# Patient Record
Sex: MALE | Race: WHITE | NOT HISPANIC OR LATINO | Employment: OTHER | URBAN - METROPOLITAN AREA
[De-identification: names, ages, dates, MRNs, and addresses within clinical notes are randomized per-mention and may not be internally consistent; named-entity substitution may affect disease eponyms.]

---

## 2017-01-18 ENCOUNTER — HOSPITAL ENCOUNTER (EMERGENCY)
Facility: MEDICAL CENTER | Age: 37
End: 2017-01-18
Attending: EMERGENCY MEDICINE
Payer: MEDICAID

## 2017-01-18 VITALS
DIASTOLIC BLOOD PRESSURE: 72 MMHG | HEIGHT: 69 IN | HEART RATE: 76 BPM | OXYGEN SATURATION: 97 % | TEMPERATURE: 98.4 F | SYSTOLIC BLOOD PRESSURE: 117 MMHG | WEIGHT: 134.7 LBS | BODY MASS INDEX: 19.95 KG/M2 | RESPIRATION RATE: 16 BRPM

## 2017-01-18 DIAGNOSIS — M79.674 PAIN OF TOE OF RIGHT FOOT: ICD-10-CM

## 2017-01-18 LAB
ALBUMIN SERPL BCP-MCNC: 4.8 G/DL (ref 3.2–4.9)
ALBUMIN/GLOB SERPL: 1.7 G/DL
ALP SERPL-CCNC: 59 U/L (ref 30–99)
ALT SERPL-CCNC: 26 U/L (ref 2–50)
ANION GAP SERPL CALC-SCNC: 8 MMOL/L (ref 0–11.9)
APTT PPP: 26.9 SEC (ref 24.7–36)
AST SERPL-CCNC: 23 U/L (ref 12–45)
BASOPHILS # BLD AUTO: 1.2 % (ref 0–1.8)
BASOPHILS # BLD: 0.08 K/UL (ref 0–0.12)
BILIRUB SERPL-MCNC: 0.5 MG/DL (ref 0.1–1.5)
BUN SERPL-MCNC: 9 MG/DL (ref 8–22)
CALCIUM SERPL-MCNC: 9.3 MG/DL (ref 8.4–10.2)
CHLORIDE SERPL-SCNC: 103 MMOL/L (ref 96–112)
CO2 SERPL-SCNC: 27 MMOL/L (ref 20–33)
CREAT SERPL-MCNC: 0.85 MG/DL (ref 0.5–1.4)
EOSINOPHIL # BLD AUTO: 0.07 K/UL (ref 0–0.51)
EOSINOPHIL NFR BLD: 1 % (ref 0–6.9)
ERYTHROCYTE [DISTWIDTH] IN BLOOD BY AUTOMATED COUNT: 42 FL (ref 35.9–50)
GFR SERPL CREATININE-BSD FRML MDRD: >60 ML/MIN/1.73 M 2
GLOBULIN SER CALC-MCNC: 2.9 G/DL (ref 1.9–3.5)
GLUCOSE SERPL-MCNC: 98 MG/DL (ref 65–99)
HCT VFR BLD AUTO: 45.5 % (ref 42–52)
HGB BLD-MCNC: 15.9 G/DL (ref 14–18)
IMM GRANULOCYTES # BLD AUTO: 0.02 K/UL (ref 0–0.11)
IMM GRANULOCYTES NFR BLD AUTO: 0.3 % (ref 0–0.9)
INR PPP: 0.97 (ref 0.87–1.13)
LV EJECT FRACT  99904: 70
LV EJECT FRACT MOD 2C 99903: 72.37
LV EJECT FRACT MOD 4C 99902: 64.86
LV EJECT FRACT MOD BP 99901: 70.49
LYMPHOCYTES # BLD AUTO: 1.03 K/UL (ref 1–4.8)
LYMPHOCYTES NFR BLD: 15 % (ref 22–41)
MCH RBC QN AUTO: 31 PG (ref 27–33)
MCHC RBC AUTO-ENTMCNC: 34.9 G/DL (ref 33.7–35.3)
MCV RBC AUTO: 88.7 FL (ref 81.4–97.8)
MONOCYTES # BLD AUTO: 0.56 K/UL (ref 0–0.85)
MONOCYTES NFR BLD AUTO: 8.2 % (ref 0–13.4)
NEUTROPHILS # BLD AUTO: 5.1 K/UL (ref 1.82–7.42)
NEUTROPHILS NFR BLD: 74.3 % (ref 44–72)
NRBC # BLD AUTO: 0 K/UL
NRBC BLD AUTO-RTO: 0 /100 WBC
PLATELET # BLD AUTO: 255 K/UL (ref 164–446)
PMV BLD AUTO: 9.9 FL (ref 9–12.9)
POTASSIUM SERPL-SCNC: 3.8 MMOL/L (ref 3.6–5.5)
PROT SERPL-MCNC: 7.7 G/DL (ref 6–8.2)
PROTHROMBIN TIME: 12.7 SEC (ref 12–14.6)
RBC # BLD AUTO: 5.13 M/UL (ref 4.7–6.1)
SODIUM SERPL-SCNC: 138 MMOL/L (ref 135–145)
WBC # BLD AUTO: 6.9 K/UL (ref 4.8–10.8)

## 2017-01-18 PROCEDURE — 85025 COMPLETE CBC W/AUTO DIFF WBC: CPT

## 2017-01-18 PROCEDURE — 93922 UPR/L XTREMITY ART 2 LEVELS: CPT

## 2017-01-18 PROCEDURE — 93005 ELECTROCARDIOGRAM TRACING: CPT | Performed by: EMERGENCY MEDICINE

## 2017-01-18 PROCEDURE — 85610 PROTHROMBIN TIME: CPT

## 2017-01-18 PROCEDURE — 80053 COMPREHEN METABOLIC PANEL: CPT

## 2017-01-18 PROCEDURE — 36415 COLL VENOUS BLD VENIPUNCTURE: CPT

## 2017-01-18 PROCEDURE — 700102 HCHG RX REV CODE 250 W/ 637 OVERRIDE(OP): Performed by: EMERGENCY MEDICINE

## 2017-01-18 PROCEDURE — 93922 UPR/L XTREMITY ART 2 LEVELS: CPT | Mod: 26 | Performed by: SURGERY

## 2017-01-18 PROCEDURE — 93926 LOWER EXTREMITY STUDY: CPT

## 2017-01-18 PROCEDURE — A9270 NON-COVERED ITEM OR SERVICE: HCPCS | Performed by: EMERGENCY MEDICINE

## 2017-01-18 PROCEDURE — 99284 EMERGENCY DEPT VISIT MOD MDM: CPT

## 2017-01-18 PROCEDURE — 93306 TTE W/DOPPLER COMPLETE: CPT | Mod: 26 | Performed by: INTERNAL MEDICINE

## 2017-01-18 PROCEDURE — 85730 THROMBOPLASTIN TIME PARTIAL: CPT

## 2017-01-18 PROCEDURE — 93306 TTE W/DOPPLER COMPLETE: CPT

## 2017-01-18 PROCEDURE — 93926 LOWER EXTREMITY STUDY: CPT | Mod: 26 | Performed by: SURGERY

## 2017-01-18 RX ORDER — CLOPIDOGREL BISULFATE 75 MG/1
75 TABLET ORAL DAILY
Qty: 30 TAB | Refills: 1 | Status: SHIPPED | OUTPATIENT
Start: 2017-01-18 | End: 2018-02-01

## 2017-01-18 RX ORDER — IBUPROFEN 200 MG
400 TABLET ORAL
Status: SHIPPED | COMMUNITY
End: 2018-02-01

## 2017-01-18 RX ORDER — CLOPIDOGREL BISULFATE 75 MG/1
75 TABLET ORAL ONCE
Status: COMPLETED | OUTPATIENT
Start: 2017-01-18 | End: 2017-01-18

## 2017-01-18 RX ADMIN — CLOPIDOGREL 75 MG: 75 TABLET, FILM COATED ORAL at 16:24

## 2017-01-18 ASSESSMENT — PAIN SCALES - WONG BAKER: WONGBAKER_NUMERICALRESPONSE: HURTS EVEN MORE

## 2017-01-18 ASSESSMENT — PAIN SCALES - GENERAL: PAINLEVEL_OUTOF10: 6

## 2017-01-18 NOTE — ED AVS SNAPSHOT
Hyper Urban Level User [place] Access Code: [email or web address]  Expires: 1/28/2017  4:55 PM    Hyper Urban Level User [place]  A secure, online tool to manage your health information     [employer] Hyper Urban Level User [place]® is a secure, online tool that connects you to your personalized health information from the privacy of your home -- day or night - making it very easy for you to manage your healthcare. Once the activation process is completed, you can even access your medical information using the Hyper Urban Level User [place] randal, which is available for free in the Apple Randal store or Google Play store.     Hyper Urban Level User [place] provides the following levels of access (as shown below):   My Chart Features   [facility] Primary Care Doctor [facility]  Specialists [facility]  Urgent  Care Non-[facility]  Primary Care  Doctor   Email your healthcare team securely and privately 24/7 X X X X   Manage appointments: schedule your next appointment; view details of past/upcoming appointments X      Request prescription refills. X      View recent personal medical records, including lab and immunizations X X X X   View health record, including health history, allergies, medications X X X X   Read reports about your outpatient visits, procedures, consult and ER notes X X X X   See your discharge summary, which is a recap of your hospital and/or ER visit that includes your diagnosis, lab results, and care plan. X X       How to register for Hyper Urban Level User [place]:  1. Go to  [email or web address].  2. Click on the Sign Up Now box, which takes you to the New Member Sign Up page. You will need to provide the following information:  a. Enter your Hyper Urban Level User [place] Access Code exactly as it appears at the top of this page. (You will not need to use this code after you’ve completed the sign-up process. If you do not sign up before the expiration date, you must request a new code.)   b. Enter your date of birth.   c. Enter your home email address.   d. Click Submit, and follow the next screen’s instructions.  3. Create a Hyper Urban Level User [place] ID. This will be your Hyper Urban Level User [place]  login ID and cannot be changed, so think of one that is secure and easy to remember.  4. Create a [place] password. You can change your password at any time.  5. Enter your Password Reset Question and Answer. This can be used at a later time if you forget your password.   6. Enter your e-mail address. This allows you to receive e-mail notifications when new information is available in [employer]ian.  7. Click Sign Up. You can now view your health information.    For assistance activating your [employer] account, call [phone]

## 2017-01-18 NOTE — ED NOTES
POC discussed with pt using AIDET. Pt verbalized understanding. Pt with call light in reach and gurney in low position for pt safety. Pt given warm blanket for comfort.

## 2017-01-18 NOTE — ED AVS SNAPSHOT
After Visit Summary                                                                                                                Luis Manuel Hernandez   MRN: [id number]    Department:  [facility], Emergency Dept   Date of Visit:  1/18/2017            [facility], Emergency Dept    [address]    [place] [address]    Phone:  [phone]      You were seen by     1. Amadeo Aponte M.D.    2. Keaton Marte M.D.      Your Diagnosis Was     Pain of toe of right foot     M79.674       Follow-up Information     1. Follow up with Derik Estrada M.D..    Specialty:  Surgery    Contact information    [id number]  [place] [address] [phone]        Medication Information     Review all of your home medications and newly ordered medications with your primary doctor and/or pharmacist as soon as possible. Follow medication instructions as directed by your doctor and/or pharmacist.     Please keep your complete medication list with you and share with your physician. Update the information when medications are discontinued, doses are changed, or new medications (including over-the-counter products) are added; and carry medication information at all times in the event of emergency situations.               Medication List      START taking these medications        Instructions    clopidogrel 75 MG Tabs   Commonly known as:  PLAVIX    Take 1 Tab by mouth every day.   Dose:  75 mg         ASK your doctor about these medications        Instructions    ibuprofen 200 MG Tabs   Commonly known as:  MOTRIN    Take 400 mg by mouth every bedtime. Toe pain   Dose:  400 mg               Procedures and tests performed during your visit     APTT    ARTERIAL EVALUATION LOWER EXTREMITY    CBC WITH DIFFERENTIAL    COMP METABOLIC PANEL    EKG (NOW)    ESTIMATED GFR    IV SALINE LOCK    LE ART DUPLX/IMAG    LE ART/KEVYN    PROTHROMBIN TIME (INR)        Discharge Instructions        Pain Without a Known Cause  You have low blood flow in the affected toe. You're being started on Plavix to prevent clotting. Contact Dr. Estrada for appointment. If worsening symptoms go to [facility] ER for evaluation.  WHAT IS PAIN WITHOUT A KNOWN CAUSE?  Pain can occur in any part of the body and can range from mild to severe. Sometimes no cause can be found for why you are having pain. Some types of pain that can occur without a known cause include:   · Headache.  · Back pain.  · Abdominal pain.  · Neck pain.  HOW IS PAIN WITHOUT A KNOWN CAUSE DIAGNOSED?   Your health care provider will try to find the cause of your pain. This may include:  · Physical exam.  · Medical history.  · Blood tests.  · Urine tests.  · X-rays.  If no cause is found, your health care provider may diagnose you with pain without a known cause.   IS THERE TREATMENT FOR PAIN WITHOUT A CAUSE?   Treatment depends on the kind of pain you have. Your health care provider may prescribe medicines to help relieve your pain.   WHAT CAN I DO AT HOME FOR MY PAIN?   · Take medicines only as directed by your health care provider.  · Stop any activities that cause pain. During periods of severe pain, bed rest may help.  · Try to reduce your stress with activities such as yoga or meditation. Talk to your health care provider for other stress-reducing activity recommendations.  · Exercise regularly, if approved by your health care provider.  · Eat a healthy diet that includes fruits and vegetables. This may improve pain. Talk to your health care provider if you have any questions about your diet.  WHAT IF MY PAIN DOES NOT GET BETTER?   If you have a painful condition and no reason can be found for the pain or the pain gets worse, it is important to follow up with your health care provider. It may be necessary to repeat tests and look further for a possible cause.      This information is not intended to replace advice given to you by your health care  provider. Make sure you discuss any questions you have with your health care provider.     Document Released: 09/12/2002 Document Revised: 01/08/2016 Document Reviewed: 05/05/2015  Elsevier Interactive Patient Education ©2016 [employer]            Patient Information     Patient Information    Following emergency treatment: all patient requiring follow-up care must return either to a private physician or a clinic if your condition worsens before you are able to obtain further medical attention, please return to the emergency room.     Billing Information    At [facility], we work to make the billing process streamlined for our patients.  Our Representatives are here to answer any questions you may have regarding your hospital bill.  If you have insurance coverage and have supplied your insurance information to us, we will submit a claim to your insurer on your behalf.  Should you have any questions regarding your bill, we can be reached online or by phone as follows:  Online: You are able pay your bills online or live chat with our representatives about any billing questions you may have. We are here to help Monday - Friday from 8:00am to 7:30pm and 9:00am - 12:00pm on Saturdays.  Please visit [email or web address]  for more information.   Phone:  [phone] or [phone]    Please note that your emergency physician, surgeon, pathologist, radiologist, anesthesiologist, and other specialists are not employed by [facility] and will therefore bill separately for their services.  Please contact them directly for any questions concerning their bills at the numbers below:     Emergency Physician Services:  [phone]  [facility]:  [phone]  [facility]:  [phone]  [facility]:  [phone]    1. Your final bill may vary from the amount quoted upon discharge if all procedures are not complete at that time, or if your doctor  has additional procedures of which we are not aware. You will receive an additional bill if you return to the Emergency Department at [facility] for suture removal regardless of the facility of which the sutures were placed.     2. Please arrange for settlement of this account at the emergency registration.    3. All self-pay accounts are due in full at the time of treatment.  If you are unable to meet this obligation then payment is expected within 4-5 days.     4. If you have had radiology studies (CT, X-ray, Ultrasound, MRI), you have received a preliminary result during your emergency department visit. Please contact the radiology department [phone] to receive a copy of your final result. Please discuss the Final result with your primary physician or with the follow up physician provided.     Crisis Hotline:  [place] Crisis Hotline:  2-765-HVSKLRY or 1-668.449.5256  [place] Crisis Hotline:    1-207.874.5387 or 306-756-5519         ED Discharge Follow Up Questions    1. In order to provide you with very good care, we would like to follow up with a phone call in the next few days.  May we have your permission to contact you?     YES /  NO    2. What is the best phone number to call you? (       )_____-__________    3. What is the best time to call you?      Morning  /  Afternoon  /  Evening                   Patient Signature:  ____________________________________________________________    Date:  ____________________________________________________________

## 2017-01-18 NOTE — ED PROVIDER NOTES
ED Provider Note    CHIEF COMPLAINT  Chief Complaint   Patient presents with   • Toe Pain     R foot 4th toe       HPI  Luis Manuel Hernandez is a 36 y.o. male who presents with toe pain. It's the pain in the toe started 3 weeks ago. It was on 25 December. He came in was diagnosed with infection and placed on Bactrim and another antibiotic finished them with no improvement. He's had pain. The toes and then purple since the beginning. Primarily 3rd digit on the right foot. It's painful to touch. He's had no fever no chills he has no abdominal pain chest pain no trauma no shortness of breath no nausea or vomiting. Patient describes a stroke when he was in [place] and had surgery it sounds vascular. He had a surgery that resolved it. He has no other medical problems.    Social history: Smoker occasional alcohol no illicit drugs no IV drugs.        REVIEW OF SYSTEMS  See HPI for further details    PAST MEDICAL HISTORY  Past Medical History   Diagnosis Date   • Stroke (CMS-[facility])    • Memory loss    • Seizure (CMS-[facility])    • Brain aneurysm        FAMILY HISTORY  Family History   Problem Relation Age of Onset   • Heart Disease Maternal Grandmother    • Heart Disease Maternal Grandfather    • Cancer Maternal Grandfather 68     pancreatic       SOCIAL HISTORY  Social History     Social History   • Marital Status: Single     Spouse Name: N/A   • Number of Children: N/A   • Years of Education: N/A     Social History Main Topics   • Smoking status: Current Every Day Smoker -- 0.50 packs/day for 20 years     Types: Cigarettes   • Smokeless tobacco: Never Used   • Alcohol Use: 0.6 oz/week     1 Glasses of wine per week   • Drug Use: Yes     Special: Marijuana      Comment: speed, meth, and cocaine last used 15 years ago, never IV   • Sexual Activity:     Partners: Female     Birth Control/ Protection: Condom     Other Topics Concern   • Not on file     Social History Narrative       SURGICAL HISTORY  Past Surgical History   Procedure  "Laterality Date   • Craniotomy aneurysm Left      age 23   • Facial fracture orif         CURRENT MEDICATIONS  Home Medications     **Home medications have not yet been reviewed for this encounter**          ALLERGIES  No Known Allergies    PHYSICAL EXAM  VITAL SIGNS: /73 mmHg  Pulse 89  Temp(Src) 36.9 °C (98.4 °F)  Resp 16  Ht 1.753 m (5' 9\")  Wt 61.1 kg (134 lb 11.2 oz)  BMI 19.88 kg/m2    Constitutional: Patient is alert and oriented x3 in no distress   HENT: Moist mucous membranes  Eyes: No conjunctivitis or icterus  Neck: Trachea is midline no palpable thyroid  Lymphatic: Negative anterior cervical lymphadenopathy  Cardiovascular: Normal heart rate   Thorax & Lungs: Clear to auscultation  Abdomen: Nontender  Neurologic: Normal motor sensation  Extremities: Patient has deep purple of the 4th digit on the right foot distal tip. There is some firmness on the lateral aspect of the tip as well. He also has what appears to be some vascular insufficiency of the tips of the toes on the great toe on the right and the left toes as well that is minimal  Psychiatric: Affect normal, Judgment normal, Mood normal.     EKG: Normal sinus rhythm with a normal corrected QT interval rate is 63 normal QRS and normal axis nonspecific T-wave changes only no old EKG for comparison    Results for orders placed or performed during the hospital encounter of 01/18/17   CBC WITH DIFFERENTIAL   Result Value Ref Range    WBC 6.9 4.8 - 10.8 K/uL    RBC 5.13 4.70 - 6.10 M/uL    Hemoglobin 15.9 14.0 - 18.0 g/dL    Hematocrit 45.5 42.0 - 52.0 %    MCV 88.7 81.4 - 97.8 fL    MCH 31.0 27.0 - 33.0 pg    MCHC 34.9 33.7 - 35.3 g/dL    RDW 42.0 35.9 - 50.0 fL    Platelet Count 255 164 - 446 K/uL    MPV 9.9 9.0 - 12.9 fL    Neutrophils-Polys 74.30 (H) 44.00 - 72.00 %    Lymphocytes 15.00 (L) 22.00 - 41.00 %    Monocytes 8.20 0.00 - 13.40 %    Eosinophils 1.00 0.00 - 6.90 %    Basophils 1.20 0.00 - 1.80 %    Immature Granulocytes 0.30 0.00 - " 0.90 %    Nucleated RBC 0.00 /100 WBC    Neutrophils (Absolute) 5.10 1.82 - 7.42 K/uL    Lymphs (Absolute) 1.03 1.00 - 4.80 K/uL    Monos (Absolute) 0.56 0.00 - 0.85 K/uL    Eos (Absolute) 0.07 0.00 - 0.51 K/uL    Baso (Absolute) 0.08 0.00 - 0.12 K/uL    Immature Granulocytes (abs) 0.02 0.00 - 0.11 K/uL    NRBC (Absolute) 0.00 K/uL   COMP METABOLIC PANEL   Result Value Ref Range    Sodium 138 135 - 145 mmol/L    Potassium 3.8 3.6 - 5.5 mmol/L    Chloride 103 96 - 112 mmol/L    Co2 27 20 - 33 mmol/L    Anion Gap 8.0 0.0 - 11.9    Glucose 98 65 - 99 mg/dL    Bun 9 8 - 22 mg/dL    Creatinine 0.85 0.50 - 1.40 mg/dL    Calcium 9.3 8.4 - 10.2 mg/dL    AST(SGOT) 23 12 - 45 U/L    ALT(SGPT) 26 2 - 50 U/L    Alkaline Phosphatase 59 30 - 99 U/L    Total Bilirubin 0.5 0.1 - 1.5 mg/dL    Albumin 4.8 3.2 - 4.9 g/dL    Total Protein 7.7 6.0 - 8.2 g/dL    Globulin 2.9 1.9 - 3.5 g/dL    A-G Ratio 1.7 g/dL   PROTHROMBIN TIME (INR)   Result Value Ref Range    PT 12.7 12.0 - 14.6 sec    INR 0.97 0.87 - 1.13   APTT   Result Value Ref Range    APTT 26.9 24.7 - 36.0 sec   ESTIMATED GFR   Result Value Ref Range    GFR If African American >60 >60 mL/min/1.73 m 2    GFR If Non African American >60 >60 mL/min/1.73 m 2   EKG (NOW)   Result Value Ref Range    Report       [facility] Emergency Dept.    Test Date:  2017  Pt Name:    WENDY BEATTY                Department: EDS  MRN:        [id number]                      [facility]  Gender:     M                            Technician: VARGAS  :        [id number]                   Requested By:RENATO DAN  Order #:    [id number]                    Reading MD:    Measurements  Intervals                                Axis  Rate:       63                           P:          37  VA:         147                          QRS:        -5  QRSD:       88                           T:          42  QT:         380  QTc:        389    Interpretive  Statements  Sinus rhythm  Ventricular premature complex  No previous ECG available for comparison            COURSE & MEDICAL DECISION MAKING  Pertinent Labs & Imaging studies reviewed. (See chart for details)  Patient's toe pain appears to be vascular in nature. As if he had emboli or peripheral vascular disease. I am obtaining arterial studies as well as EKG to rule out atrial fibrillation.    2:05 PM: Labs returned normal. The patient's noninvasive study of the right leg is normal. PPG's of the toes were done and he does have significant decreased blood flow in the right affected toe.    Contacted Dr. Estrada. I am ordering an echo with bubble study to rule out a septal defect. If that is negative the patient be placed on Plavix and follow-up with vascular.        FINAL IMPRESSION  1.   1. Pain of toe of right foot        2.   3.         Electronically signed by: Amadeo Aponte, 1/18/2017 11:56 AM

## 2017-01-18 NOTE — ED NOTES
Pt with multiple toes on right foot discolored, purple. Pt seen here for same 2+ weeks ago. Pt was given antibiotics and pt states he finished meds as directed.

## 2017-01-18 NOTE — ED NOTES
Pt amb to triage c/o R 4th toe pain. Pt seen x3 wks ago for same s/s. Pt finished a course of antibiotics approx 1 wk ago. Pt states toe appears worse

## 2017-01-18 NOTE — DISCHARGE INSTRUCTIONS
Pain Without a Known Cause  You have low blood flow in the affected toe. You're being started on Plavix to prevent clotting. Contact Dr. Estrada for appointment. If worsening symptoms go to [facility] ER for evaluation.  WHAT IS PAIN WITHOUT A KNOWN CAUSE?  Pain can occur in any part of the body and can range from mild to severe. Sometimes no cause can be found for why you are having pain. Some types of pain that can occur without a known cause include:   · Headache.  · Back pain.  · Abdominal pain.  · Neck pain.  HOW IS PAIN WITHOUT A KNOWN CAUSE DIAGNOSED?   Your health care provider will try to find the cause of your pain. This may include:  · Physical exam.  · Medical history.  · Blood tests.  · Urine tests.  · X-rays.  If no cause is found, your health care provider may diagnose you with pain without a known cause.   IS THERE TREATMENT FOR PAIN WITHOUT A CAUSE?   Treatment depends on the kind of pain you have. Your health care provider may prescribe medicines to help relieve your pain.   WHAT CAN I DO AT HOME FOR MY PAIN?   · Take medicines only as directed by your health care provider.  · Stop any activities that cause pain. During periods of severe pain, bed rest may help.  · Try to reduce your stress with activities such as yoga or meditation. Talk to your health care provider for other stress-reducing activity recommendations.  · Exercise regularly, if approved by your health care provider.  · Eat a healthy diet that includes fruits and vegetables. This may improve pain. Talk to your health care provider if you have any questions about your diet.  WHAT IF MY PAIN DOES NOT GET BETTER?   If you have a painful condition and no reason can be found for the pain or the pain gets worse, it is important to follow up with your health care provider. It may be necessary to repeat tests and look further for a possible cause.      This information is not intended to replace advice given to you by your health care  provider. Make sure you discuss any questions you have with your health care provider.     Document Released: 09/12/2002 Document Revised: 01/08/2016 Document Reviewed: 05/05/2015  Elsevier Interactive Patient Education ©2016 [employer] Inc.

## 2017-01-18 NOTE — ED AVS SNAPSHOT
1/18/2017          Luis Manuel Hernandez  [address]  [place] [address]    Dear Luis Manuel:    [facility] wants to ensure your discharge home is safe and you or your loved ones have had all your questions answered regarding your care after you leave the hospital.    You may receive a telephone call within two days of your discharge.  This call is to make certain you understand your discharge instructions as well as ensure we provided you with the best care possible during your stay with us.     The call will only last approximately 3-5 minutes and will be done by a nurse.    Once again, we want to ensure your discharge home is safe and that you have a clear understanding of any next steps in your care.  If you have any questions or concerns, please do not hesitate to contact us, we are here for you.  Thank you for choosing [facility] for your healthcare needs.    Sincerely,    Leobardo Ferro    [facility]

## 2017-01-19 NOTE — ED NOTES
Pt D/C to home. IV removed. D/C instructions and prescriptions given. Pt v/u. Pt leaves ED with no acute changes, complaints or concerns.

## 2017-02-07 ENCOUNTER — HOSPITAL ENCOUNTER (OUTPATIENT)
Dept: RADIOLOGY | Facility: MEDICAL CENTER | Age: 37
End: 2017-02-07
Attending: SURGERY
Payer: MEDICAID

## 2017-02-07 DIAGNOSIS — I73.9 PERIPHERAL VASCULAR DISEASE, UNSPECIFIED (HCC): ICD-10-CM

## 2017-02-07 DIAGNOSIS — R23.0 BLUE TOES: ICD-10-CM

## 2017-02-07 PROCEDURE — 700117 HCHG RX CONTRAST REV CODE 255: Performed by: SURGERY

## 2017-02-07 PROCEDURE — 74175 CTA ABDOMEN W/CONTRAST: CPT

## 2017-02-07 RX ADMIN — IOHEXOL 100 ML: 350 INJECTION, SOLUTION INTRAVENOUS at 08:30

## 2017-02-27 LAB — EKG IMPRESSION: NORMAL

## 2017-12-20 ENCOUNTER — OFFICE VISIT (OUTPATIENT)
Dept: MEDICAL GROUP | Facility: MEDICAL CENTER | Age: 37
End: 2017-12-20
Attending: INTERNAL MEDICINE
Payer: MEDICAID

## 2017-12-20 VITALS
BODY MASS INDEX: 18.51 KG/M2 | HEIGHT: 69 IN | DIASTOLIC BLOOD PRESSURE: 70 MMHG | HEART RATE: 92 BPM | WEIGHT: 125 LBS | RESPIRATION RATE: 16 BRPM | OXYGEN SATURATION: 98 % | SYSTOLIC BLOOD PRESSURE: 100 MMHG | TEMPERATURE: 97.8 F

## 2017-12-20 DIAGNOSIS — R00.2 PALPITATIONS: ICD-10-CM

## 2017-12-20 PROCEDURE — 93000 ELECTROCARDIOGRAM COMPLETE: CPT | Performed by: INTERNAL MEDICINE

## 2017-12-20 PROCEDURE — 99213 OFFICE O/P EST LOW 20 MIN: CPT | Performed by: INTERNAL MEDICINE

## 2017-12-20 PROCEDURE — 99214 OFFICE O/P EST MOD 30 MIN: CPT | Performed by: INTERNAL MEDICINE

## 2017-12-20 ASSESSMENT — PAIN SCALES - GENERAL: PAINLEVEL: 3=SLIGHT PAIN

## 2017-12-20 NOTE — PROGRESS NOTES
"Subjective:   Luis Manuel Hernandez is a 37 y.o. male here today for Racing heartbeat, chest discomfort for several months    Palpitations  Patient reports that for the past several months, he has been experiencing a racing heartbeat. Initially, it was not daily, but it has become more frequent and now happen several times per day. States he feels like his heart is about to jump out of his chest when it's happening. He denies any associated shortness of breath. He complains of some chest discomfort but it may or may not coincide with the palpitations. States it feels like someone is \"ripping my skin off my chest\" when he rolls over in bed or moves in a certain position. About 3 weeks ago, he was helping his mom move a couch when he started to feel his heart racing, became anxious and short of breath.  Mom says he lost all the color out of his face and was clutching his chest but he denies any memory of pain. He has never sought emergency room care for any of these issues. Most of the time, his symptoms last about 5-10 minutes before he can calm himself down. He does have a family history of heart disease with his grandmother requiring open heart surgery in her early 40s. He has some anxiety associated with the palpitations but it's difficult for him to say whether the anxiety precedes the palpitations or the palpitations provoke the anxiety. However, he feels like the anxiety subsides but the palpitations still persist for several more minutes.       Current medicines (including changes today)  Current Outpatient Prescriptions   Medication Sig Dispense Refill   • ibuprofen (MOTRIN) 200 MG Tab Take 400 mg by mouth every bedtime. Toe pain     • clopidogrel (PLAVIX) 75 MG Tab Take 1 Tab by mouth every day. 30 Tab 1     No current facility-administered medications for this visit.      He  has a past medical history of Brain aneurysm; Memory loss; Seizure (CMS-HCC); and Stroke (CMS-[facility]).    ROS   As above in HPI     Objective: " "    Blood pressure 100/70, pulse 92, temperature 36.6 °C (97.8 °F), resp. rate 16, height 1.753 m (5' 9.02\"), weight 56.7 kg (125 lb), SpO2 98 %. Body mass index is 18.45 kg/m².   Physical Exam:  Constitutional: Alert, no distress.  Skin: Warm, dry, good turgor, no rashes in visible areas.  Eye: Equal, round and reactive, conjunctiva clear, lids normal.  Respiratory: Unlabored respiratory effort, lungs clear to auscultation, no wheezes, no ronchi.  Cardiovascular: regular rate and rhythm, no murmurs appreciated.  Psych: Alert and oriented x3, normal affect and mood.    EKG Interpretation    Interpreted by me    Rhythm: normal sinus   Rate: bradycardia and 50-60  Axis: normal  Ectopy: none  Conduction: normal  ST Segments: no acute change  T Waves: no acute change  Q Waves: none    Clinical Impression: no acute changes      Assessment and Plan:   The following treatment plan was discussed    1. Palpitations  No acute changes on EKG in clinic today although he did have PVCs on a previous EKG. Since he is symptomatic multiple times daily, we will obtain a 24-hour Holter monitor. Since he had some chest discomfort associated with some of the palpitations and has a family history of coronary artery disease, we will also get a stress test. Patient will follow-up with me for results after completing these tests.  - HOLTER - Cardiology Performed (24HR); Future  - CARDIAC STRESS TEST TREADMILL ONLY  - EKG      Followup: Return in about 4 weeks (around 1/17/2018) for test results.         "

## 2017-12-20 NOTE — ASSESSMENT & PLAN NOTE
"Patient reports that for the past several months, he has been experiencing a racing heartbeat. Initially, it was not daily, but it has become more frequent and now happen several times per day. States he feels like his heart is about to jump out of his chest when it's happening. He denies any associated shortness of breath. He complains of some chest discomfort but it may or may not coincide with the palpitations. States it feels like someone is \"ripping my skin off my chest\" when he rolls over in bed or moves in a certain position. About 3 weeks ago, he was helping his mom move a couch when he started to feel his heart racing, became anxious and short of breath.  Mom says he lost all the color out of his face and was clutching his chest but he denies any memory of pain. He has never sought emergency room care for any of these issues. Most of the time, his symptoms last about 5-10 minutes before he can calm himself down. He does have a family history of heart disease with his grandmother requiring open heart surgery in her early 40s. He has some anxiety associated with the palpitations but it's difficult for him to say whether the anxiety precedes the palpitations or the palpitations provoke the anxiety. However, he feels like the anxiety subsides but the palpitations still persist for several more minutes.  "

## 2018-01-10 ENCOUNTER — NON-PROVIDER VISIT (OUTPATIENT)
Dept: CARDIOLOGY | Facility: MEDICAL CENTER | Age: 38
End: 2018-01-10
Attending: INTERNAL MEDICINE
Payer: MEDICAID

## 2018-01-10 DIAGNOSIS — R00.2 PALPITATIONS: ICD-10-CM

## 2018-01-10 LAB — TREADMILL STRESS: NORMAL

## 2018-01-10 PROCEDURE — 93015 CV STRESS TEST SUPVJ I&R: CPT | Performed by: INTERNAL MEDICINE

## 2018-01-23 ENCOUNTER — NON-PROVIDER VISIT (OUTPATIENT)
Dept: CARDIOLOGY | Facility: MEDICAL CENTER | Age: 38
End: 2018-01-23
Payer: MEDICAID

## 2018-01-23 ENCOUNTER — TELEPHONE (OUTPATIENT)
Dept: MEDICAL GROUP | Facility: MEDICAL CENTER | Age: 38
End: 2018-01-23

## 2018-01-23 DIAGNOSIS — R00.1 SINUS BRADYCARDIA: ICD-10-CM

## 2018-01-23 DIAGNOSIS — R00.2 PALPITATIONS: ICD-10-CM

## 2018-01-23 DIAGNOSIS — I49.3 PVC (PREMATURE VENTRICULAR CONTRACTION): ICD-10-CM

## 2018-01-23 NOTE — TELEPHONE ENCOUNTER
Patient stopped by the office requesting stress test results. Patient would like to get a call back with that information.

## 2018-01-24 NOTE — TELEPHONE ENCOUNTER
You can let him know the stress test was normal.  I am seeing him on 2/1 so we can chat further if he has question.  Audelia Capps M.D.

## 2018-02-01 ENCOUNTER — OFFICE VISIT (OUTPATIENT)
Dept: MEDICAL GROUP | Facility: MEDICAL CENTER | Age: 38
End: 2018-02-01
Attending: INTERNAL MEDICINE
Payer: MEDICAID

## 2018-02-01 VITALS
HEIGHT: 69 IN | WEIGHT: 124 LBS | OXYGEN SATURATION: 98 % | HEART RATE: 80 BPM | RESPIRATION RATE: 16 BRPM | DIASTOLIC BLOOD PRESSURE: 60 MMHG | BODY MASS INDEX: 18.37 KG/M2 | SYSTOLIC BLOOD PRESSURE: 92 MMHG | TEMPERATURE: 98 F

## 2018-02-01 DIAGNOSIS — Z13.1 SCREENING FOR DIABETES MELLITUS: ICD-10-CM

## 2018-02-01 DIAGNOSIS — R53.83 OTHER FATIGUE: ICD-10-CM

## 2018-02-01 DIAGNOSIS — R07.89 MUSCULOSKELETAL CHEST PAIN: ICD-10-CM

## 2018-02-01 DIAGNOSIS — R00.2 PALPITATIONS: ICD-10-CM

## 2018-02-01 PROCEDURE — 99212 OFFICE O/P EST SF 10 MIN: CPT | Performed by: INTERNAL MEDICINE

## 2018-02-01 PROCEDURE — 99214 OFFICE O/P EST MOD 30 MIN: CPT | Performed by: INTERNAL MEDICINE

## 2018-02-01 ASSESSMENT — PAIN SCALES - GENERAL: PAINLEVEL: NO PAIN

## 2018-02-02 PROBLEM — R07.89 MUSCULOSKELETAL CHEST PAIN: Status: ACTIVE | Noted: 2018-02-02

## 2018-02-02 LAB — EKG IMPRESSION: NORMAL

## 2018-02-02 PROCEDURE — 93224 XTRNL ECG REC UP TO 48 HRS: CPT | Performed by: INTERNAL MEDICINE

## 2018-02-02 NOTE — PROGRESS NOTES
"Subjective:   Luis Manuel Hernandez is a 37 y.o. male here today for review of stress test, follow-up chest pain and palpitations, fatigue    Fatigue  Patient reports worsening fatigue throughout the day. He does not fall asleep easily while watching TV or reading a book until around 8 PM. States he is sleeping well through the night although sometimes he does wake up around 4 AM and he does notice increased fatigue on these days. He denies snoring, awakening unable to breathe. He does not have any recent blood work in the system. He recently had a negative treadmill stress test.     Palpitations  Patient states that he just turned in his Holter monitor yesterday. I have not received the results of this yet. He states that when he was wearing it for the 24 hours she did not have the same degree of palpitations that he generally feels. He does report that some of the palpitations and pain in his chest that he was feeling do seem to be related to anxiety.     Musculoskeletal chest pain  Patient reports he continues to experience a sort of tearing chest pain when he twists in a certain direction. He had a treadmill cardiac stress test done recently which was completely normal and he did reach a high enough heart rate for her to be considered accurate. He initially started having the pain when he was carrying a heavy couch. It has subsided somewhat.       Current medicines (including changes today)  No current outpatient prescriptions on file.     No current facility-administered medications for this visit.      He  has a past medical history of Brain aneurysm; Memory loss; Seizure (CMS-[facility]); and Stroke (CMS-HCC).    ROS   As above in HPI  Denies bleeding, melena, hematochezia  Complains of shortness of breath at rest but not with exertion, around times of anxiety     Objective:     Blood pressure (!) 92/60, pulse 80, temperature 36.7 °C (98 °F), resp. rate 16, height 1.753 m (5' 9.02\"), weight 56.2 kg (124 lb), SpO2 98 %. " Body mass index is 18.3 kg/m².   Physical Exam:  Constitutional: Alert, no distress.  Skin: Warm, dry, good turgor, no rashes in visible areas.  Eye: Equal, round and reactive, conjunctiva clear, lids normal.  Respiratory: Unlabored respiratory effort, lungs clear to auscultation, no wheezes, no ronchi.  Cardiovascular: Normal S1, S2, no murmur, no edema.  Psych: Alert and oriented x3, normal affect and mood.    Results and Imaging:   Reviewed results of patient's Holter monitor which showed 145 PVCs in the 24-hour time period. He also had a few PACs. His rate was anywhere from 47 during sleep up to 120 and always sinus.    Reviewed results of treadmill cardiac stress test which was normal    Assessment and Plan:   The following treatment plan was discussed    1. Other fatigue  No signs or symptoms to suggest sleep apnea. On the nights when he is waking up at 4 AM he does experience greater fatigue and some of his daily fatigue may be related to lack of sleep. We will rule out anemia, diabetes, and hypothyroidism with blood work.  - TSH WITH REFLEX TO FT4; Future  - CBC WITHOUT DIFFERENTIAL; Future  - HEMOGLOBIN A1C; Future    2. Palpitations  Appears to be PVCs that patient is feeling based on his 24-hour Holter monitor.  Results received and this no completed after patient's appointment. Therefore we will call him and inform him. If symptoms are extremely bothersome, could start him on a low-dose beta blocker otherwise no treatment indicated based on Holter    3. Musculoskeletal chest pain  With a negative treadmill stress test, very unlikely to be anginal pain. Given its relation to his position I suspect a musculoskeletal strain, possibly when he was moving heavy furniture. Reassurance given today.    4. Screening for diabetes mellitus  - HEMOGLOBIN A1C; Future        Followup: Return in about 3 months (around 5/1/2018), or if symptoms worsen or fail to improve.

## 2018-02-02 NOTE — ASSESSMENT & PLAN NOTE
Patient reports he continues to experience a sort of tearing chest pain when he twists in a certain direction. He had a treadmill cardiac stress test done recently which was completely normal and he did reach a high enough heart rate for her to be considered accurate. He initially started having the pain when he was carrying a heavy couch. It has subsided somewhat.

## 2018-02-02 NOTE — ASSESSMENT & PLAN NOTE
Patient reports worsening fatigue throughout the day. He does not fall asleep easily while watching TV or reading a book until around 8 PM. States he is sleeping well through the night although sometimes he does wake up around 4 AM and he does notice increased fatigue on these days. He denies snoring, awakening unable to breathe. He does not have any recent blood work in the system. He recently had a negative treadmill stress test.

## 2018-02-02 NOTE — ASSESSMENT & PLAN NOTE
Patient states that he just turned in his Holter monitor yesterday. I have not received the results of this yet. He states that when he was wearing it for the 24 hours she did not have the same degree of palpitations that he generally feels. He does report that some of the palpitations and pain in his chest that he was feeling do seem to be related to anxiety.

## 2018-02-06 ENCOUNTER — TELEPHONE (OUTPATIENT)
Dept: MEDICAL GROUP | Facility: MEDICAL CENTER | Age: 38
End: 2018-02-06

## 2018-02-06 DIAGNOSIS — I49.3 PVCS (PREMATURE VENTRICULAR CONTRACTIONS): ICD-10-CM

## 2018-02-06 DIAGNOSIS — R00.2 PALPITATIONS: ICD-10-CM

## 2018-02-06 NOTE — TELEPHONE ENCOUNTER
----- Message from Audelia Capps M.D. sent at 2/6/2018  7:45 AM PST -----  Please inform patient cardiology referral has been placed and he should receive a call to set up his appointment.    Audelia Capps M.D.

## 2018-02-20 ENCOUNTER — HOSPITAL ENCOUNTER (OUTPATIENT)
Dept: LAB | Facility: MEDICAL CENTER | Age: 38
End: 2018-02-20
Attending: INTERNAL MEDICINE
Payer: MEDICAID

## 2018-02-20 DIAGNOSIS — R53.83 OTHER FATIGUE: ICD-10-CM

## 2018-02-20 DIAGNOSIS — Z13.1 SCREENING FOR DIABETES MELLITUS: ICD-10-CM

## 2018-02-20 LAB
ERYTHROCYTE [DISTWIDTH] IN BLOOD BY AUTOMATED COUNT: 45.1 FL (ref 35.9–50)
EST. AVERAGE GLUCOSE BLD GHB EST-MCNC: 105 MG/DL
HBA1C MFR BLD: 5.3 % (ref 0–5.6)
HCT VFR BLD AUTO: 45.9 % (ref 42–52)
HGB BLD-MCNC: 15.4 G/DL (ref 14–18)
MCH RBC QN AUTO: 31 PG (ref 27–33)
MCHC RBC AUTO-ENTMCNC: 33.6 G/DL (ref 33.7–35.3)
MCV RBC AUTO: 92.4 FL (ref 81.4–97.8)
PLATELET # BLD AUTO: 284 K/UL (ref 164–446)
PMV BLD AUTO: 9.9 FL (ref 9–12.9)
RBC # BLD AUTO: 4.97 M/UL (ref 4.7–6.1)
TSH SERPL DL<=0.005 MIU/L-ACNC: 1.96 UIU/ML (ref 0.38–5.33)
WBC # BLD AUTO: 7.9 K/UL (ref 4.8–10.8)

## 2018-02-20 PROCEDURE — 85027 COMPLETE CBC AUTOMATED: CPT

## 2018-02-20 PROCEDURE — 36415 COLL VENOUS BLD VENIPUNCTURE: CPT

## 2018-02-20 PROCEDURE — 84443 ASSAY THYROID STIM HORMONE: CPT

## 2018-02-20 PROCEDURE — 83036 HEMOGLOBIN GLYCOSYLATED A1C: CPT

## 2018-02-22 ENCOUNTER — TELEPHONE (OUTPATIENT)
Dept: MEDICAL GROUP | Facility: MEDICAL CENTER | Age: 38
End: 2018-02-22

## 2018-02-22 NOTE — TELEPHONE ENCOUNTER
----- Message from Audelia Capps M.D. sent at 2/21/2018 12:43 PM PST -----  Please mail normal labs letter.  Audelia Capps M.D.

## 2019-10-22 ENCOUNTER — HOSPITAL ENCOUNTER (EMERGENCY)
Facility: MEDICAL CENTER | Age: 39
End: 2019-10-23
Attending: EMERGENCY MEDICINE
Payer: MEDICAID

## 2019-10-22 ENCOUNTER — APPOINTMENT (OUTPATIENT)
Dept: RADIOLOGY | Facility: MEDICAL CENTER | Age: 39
End: 2019-10-22
Attending: EMERGENCY MEDICINE
Payer: MEDICAID

## 2019-10-22 DIAGNOSIS — F10.920 ALCOHOLIC INTOXICATION WITHOUT COMPLICATION (HCC): ICD-10-CM

## 2019-10-22 DIAGNOSIS — F32.A DEPRESSION, UNSPECIFIED DEPRESSION TYPE: ICD-10-CM

## 2019-10-22 LAB — POC BREATHALIZER: 0.13 PERCENT (ref 0–0.01)

## 2019-10-22 PROCEDURE — 302970 POC BREATHALIZER: Performed by: EMERGENCY MEDICINE

## 2019-10-22 PROCEDURE — 71045 X-RAY EXAM CHEST 1 VIEW: CPT

## 2019-10-22 PROCEDURE — 93005 ELECTROCARDIOGRAM TRACING: CPT | Performed by: EMERGENCY MEDICINE

## 2019-10-22 PROCEDURE — 99285 EMERGENCY DEPT VISIT HI MDM: CPT

## 2019-10-22 ASSESSMENT — LIFESTYLE VARIABLES
TOTAL SCORE: 0
EVER HAD A DRINK FIRST THING IN THE MORNING TO STEADY YOUR NERVES TO GET RID OF A HANGOVER: NO
EVER FELT BAD OR GUILTY ABOUT YOUR DRINKING: NO
DO YOU DRINK ALCOHOL: YES
HAVE YOU EVER FELT YOU SHOULD CUT DOWN ON YOUR DRINKING: NO
HAVE PEOPLE ANNOYED YOU BY CRITICIZING YOUR DRINKING: NO
CONSUMPTION TOTAL: INCOMPLETE

## 2019-10-23 VITALS
WEIGHT: 130 LBS | HEIGHT: 68 IN | HEART RATE: 90 BPM | SYSTOLIC BLOOD PRESSURE: 136 MMHG | DIASTOLIC BLOOD PRESSURE: 102 MMHG | TEMPERATURE: 98 F | RESPIRATION RATE: 16 BRPM | BODY MASS INDEX: 19.7 KG/M2

## 2019-10-23 LAB
EKG IMPRESSION: NORMAL
POC BREATHALIZER: 0.05 PERCENT (ref 0–0.01)

## 2019-10-23 PROCEDURE — 302970 POC BREATHALIZER: Performed by: EMERGENCY MEDICINE

## 2019-10-23 NOTE — ED TRIAGE NOTES
"Patient BIB EMS for alcohol intoxication and SI. Patient slurring words. Patient states \"I have been depressed for a week. I want to kill myself\" patient redirectable. No meds or psych HX  "

## 2019-10-23 NOTE — ED NOTES
Patient moderate suicide risk. Sitter across from patient bed for observation. Will continue to monitor

## 2019-10-23 NOTE — ED NOTES
Patient given 2 bags of belongings back for discharge. Patient given DC and follow up instructions. Pt given return precautions. Walked with steady gait to [facility]. A&ox4

## 2019-10-23 NOTE — DISCHARGE PLANNING
"Patient lying in bed.  Calm and cooperative.  Patient reports he was drinking earlier in the day and called 911 because he needed someone to talk to.  Patient states he would not have done this if he was sober.  Patient adamantly denies suicidal/homicidal ideation. Endorses occasional depression; however, is also adamant that he does not need medication to control the symptoms.  Educated patient on the depressive effects of alcohol on mood.  Patient agreed to abstain from alcohol.  Also agreed to follow up with counseling resources provided.  Patient very eager to be discharged.  \"I just need to go home and sleep.  Can I go after we talk?\"  Patient to be discharged and follow up with resources provided.  Educated patient to return to the ER if thoughts of self harm occur or if mood deteriorates.  Patient verbalized understanding, \"I came to the ER today because I was feeling depressed because I was drunk.  If I feel depressed again I'll come back.  I promise.\"  "

## 2019-10-23 NOTE — ED PROVIDER NOTES
"ED Provider Note    Scribed for Ailyn Mar M.D. by Flash De La Torre. 10/22/2019  8:59 PM    Means of arrival: Ambulance  History obtained from: Patient  History limited by: None      CHIEF COMPLAINT  Chief Complaint   Patient presents with   • Alcohol Intoxication   • Psych Eval       HPI  Luis Manuel Hernandez is a 38 y.o. male who presents to the Emergency Department  for evaluation of depression and suicidal ideation he reports has been ongoing for the last month. He reports a history of depression but is not on antidepressants. When asked how much alcohol he has consumed today, he reports \"a lot\".  The patient reports associated difficulty breathing, chest pain, and back pain that also have been ongoing for sometime.. Negative for vomiting, homicidal ideation, or shaking. No alleviating or exacerbating factors identified. The patient denies any drug use.     REVIEW OF SYSTEMS  Pertinent positive include alcohol abuse, suicidal ideation, difficulty breathing, chest pain, and back pain. Pertinent negative include vomiting, homicidal ideation, or shaking. All other systems reviewed and are negative.      PAST MEDICAL HISTORY   has a past medical history of Brain aneurysm, Memory loss, Seizure (HCC), and Stroke (HCC).    SOCIAL HISTORY  Social History     Tobacco Use   • Smoking status: Current Every Day Smoker     Packs/day: 0.50     Years: 20.00     Pack years: 10.00     Types: Cigarettes   • Smokeless tobacco: Never Used   Substance and Sexual Activity   • Alcohol use: Yes     Alcohol/week: 0.6 oz     Types: 1 Glasses of wine per week   • Drug use: Yes     Types: Marijuana     Comment: speed, meth, and cocaine last used 15 years ago, never IV   • Sexual activity: Not Currently     Partners: Female     Birth control/protection: Condom       SURGICAL HISTORY   has a past surgical history that includes craniotomy aneurysm (Left) and facial fracture orif.    CURRENT MEDICATIONS  Home Medications     Reviewed by Ermelinda" "MATHEUS Olguin R.N. (Registered Nurse) on 10/23/19 at 0042  Med List Status: Complete   Medication Last Dose Status        Patient Jaden Taking any Medications                       ALLERGIES  No Known Allergies    PHYSICAL EXAM   VITAL SIGNS: /74   Pulse (!) 104   Temp 36.7 °C (98 °F) (Tympanic)   Resp 19   Ht 1.727 m (5' 8\")   Wt 59 kg (130 lb)   BMI 19.77 kg/m²    Constitutional: Disheveled, nontoxic appearing male. Alert in no apparent distress.  HENT: Normocephalic, Atraumatic. Bilateral external ears normal. Nose normal.  Moist mucous membranes.  Oropharynx clear.  Eyes: Pupils are equal and reactive. Conjunctiva normal.   Neck: Supple, full range of motion  Heart: Regular rate and rhythm.  No murmurs.    Lungs: No respiratory distress, normal work of breathing. Lungs clear to auscultation bilaterally.  Abdomen Soft, no distention.  No tenderness to palpation.  Musculoskeletal: Atraumatic. No obvious deformities noted.  No lower extremity edema.  Skin: Warm, Dry.  No erythema, No rash.   Neurologic: Alert and oriented x3. Moving all extremities spontaneously without focal deficits.  Psychiatric: Intoxicated. Suicidal ideation. No homicidal ideation. Affect normal, Mood normal, Appears appropriate and not intoxicated.    DIAGNOSTIC STUDIES    EKG  Results for orders placed or performed during the hospital encounter of 10/22/19   EKG (Now)   Result Value Ref Range    Report       [facility] Emergency Dept.    Test Date:  2019-10-22  Pt Name:    WENDY BEATTY                Department: ER  MRN:        [id number]                      Room:       [facility]  Gender:     Male                         Technician: [id number]  :        1980                   Requested By:AILYN ORTIZ  Order #:    [id number]                    Reading MD: Ailyn Ortiz MD    Measurements  Intervals                                Axis  Rate:       78                           P:          68  NH:         152       " "                   QRS:        31  QRSD:       88                           T:          69  QT:         400  QTc:        456    Interpretive Statements  SINUS RHYTHM  Normal axis and intervals  No ectopy or hypertrophy  T wave inversions in anterior leads, no ST change  Compared to ECG 01/18/2017 12:37:32  Anterior T wave inversions, new from prior    Electronically Signed On 10- 0:31:19 PDT by Ailyn Mar MD           LABS  Personally reviewed by me  Labs Reviewed   POC BREATHALIZER - Abnormal; Notable for the following components:       Result Value    POC Breathalizer 0.13 (*)     All other components within normal limits   POC BREATHALIZER - Abnormal; Notable for the following components:    POC Breathalizer 0.05 (*)     All other components within normal limits       RADIOLOGY  Personally reviewed by me  DX-CHEST-PORTABLE (1 VIEW)   Final Result         1.  No acute cardiopulmonary disease.          ED COURSE  Vitals:    10/22/19 2045 10/23/19 0107   BP: 123/74 136/102   Pulse: (!) 104 90   Resp: 19 16   Temp: 36.7 °C (98 °F)    TempSrc: Tympanic    Weight: 59 kg (130 lb)    Height: 1.727 m (5' 8\")          Medications administered:  Medications - No data to display      8:59 PM Patient seen and examined at bedside. The patient presents with . Ordered for DX chest, POC breathalyzer, EKG, and urine drug screen to evaluate. The patient is agreeable and understanding of my plan of care.     12:16 AM - Ordered POC Breathalizer to reevaluate alcohol levels.     12:32 AM Patient was reevaluated at bedside. He denies any pain or history of chest pain. He reports feeling better and is no longer suicidal. He states that he \"just needs help\".     MEDICAL DECISION MAKING  Patient presents with suicidal ideation in the setting of alcohol intoxication.  He is mildly tachycardic on arrival with otheriwse normal vitals.  Patient initially with multiple complaints including chest and back pain however history " difficult to further obtain as patient is intoxicated.  No evidence of trauma on exam.  EKG was performed without STEMI, does have nonspecific anterior T wave inversions.  Chest xray is negative for pneumonia, pneumothorax or pulmonary edema.      12:56 AM Upon reassessment, patient is resting comfortably with normal vital signs.  No new complaints at this time.  He is now denying recent history chest pain and requesting discharge. He denies suicidal or homicidal ideation and just is asking for someone to talk to.  I consulted with Sukumar (Behavior Health Specialist) who does not feel that a medical hold is necessary at this time. The patient is requesting out patient resources.  Patient understands plan of care and strict return precautions for new or changing symptoms.       The patient will return for new or worsening symptoms and is stable at the time of discharge.    The patient is referred to a primary physician for blood pressure management, diabetic screening, and for all other preventative health concerns.    DISPOSITION:  Patient will be discharged home in stable condition.    FOLLOW UP:  [facility]  [address] [place] [id number] [phone]  Schedule an appointment as soon as possible for a visit       [facility], Emergency Dept  [address] [facility] [address] [phone]    If symptoms worsen      OUTPATIENT MEDICATIONS:  There are no discharge medications for this patient.          IMPRESSION  (F10.920) Alcoholic intoxication without complication (HCC)  (F32.9) Depression, unspecified depression type    Results, diagnoses, and treatment options were discussed with the patient and/or family. Patient verbalized understanding of plan of care.    There are no discharge medications for this patient.           IFlash (Pat), am scribing for, and in the presence of, Ailyn Mar M.D..    Electronically signed by: Flash De La Torre (Pat),  10/22/2019    Ailyn ARIAS M.D. personally performed the services described in this documentation, as scribed by Flash De La Torre in my presence, and it is both accurate and complete. C.    The note accurately reflects work and decisions made by me.  Ailyn Mar  10/23/2019  4:59 PM

## 2019-10-23 NOTE — ED NOTES
Patient resting in bed. No complaints at this time. Sitter across from patient bedside. Will continue to montor

## 2022-04-18 ENCOUNTER — PHARMACY VISIT (OUTPATIENT)
Dept: PHARMACY | Facility: MEDICAL CENTER | Age: 42
End: 2022-04-18
Payer: COMMERCIAL

## 2022-04-18 ENCOUNTER — HOSPITAL ENCOUNTER (EMERGENCY)
Facility: MEDICAL CENTER | Age: 42
End: 2022-04-18
Attending: EMERGENCY MEDICINE
Payer: MEDICAID

## 2022-04-18 VITALS
TEMPERATURE: 98.3 F | HEIGHT: 71 IN | OXYGEN SATURATION: 99 % | DIASTOLIC BLOOD PRESSURE: 76 MMHG | HEART RATE: 78 BPM | SYSTOLIC BLOOD PRESSURE: 123 MMHG | WEIGHT: 137.79 LBS | RESPIRATION RATE: 16 BRPM | BODY MASS INDEX: 19.29 KG/M2

## 2022-04-18 DIAGNOSIS — L72.3 INFECTED SEBACEOUS CYST: ICD-10-CM

## 2022-04-18 DIAGNOSIS — L08.9 INFECTED SEBACEOUS CYST: ICD-10-CM

## 2022-04-18 PROCEDURE — RXMED WILLOW AMBULATORY MEDICATION CHARGE: Performed by: EMERGENCY MEDICINE

## 2022-04-18 PROCEDURE — 700101 HCHG RX REV CODE 250: Performed by: EMERGENCY MEDICINE

## 2022-04-18 PROCEDURE — 303977 HCHG I & D: Mod: EDC

## 2022-04-18 PROCEDURE — 304217 HCHG IRRIGATION SYSTEM: Mod: EDC

## 2022-04-18 PROCEDURE — 99282 EMERGENCY DEPT VISIT SF MDM: CPT | Mod: EDC,25

## 2022-04-18 RX ORDER — DOXYCYCLINE 100 MG/1
100 CAPSULE ORAL 2 TIMES DAILY
Qty: 14 CAPSULE | Refills: 0 | Status: SHIPPED | OUTPATIENT
Start: 2022-04-18 | End: 2022-04-25

## 2022-04-18 RX ORDER — LIDOCAINE HYDROCHLORIDE AND EPINEPHRINE BITARTRATE 20; .01 MG/ML; MG/ML
10 INJECTION, SOLUTION SUBCUTANEOUS ONCE
Status: COMPLETED | OUTPATIENT
Start: 2022-04-18 | End: 2022-04-18

## 2022-04-18 RX ADMIN — LIDOCAINE HYDROCHLORIDE AND EPINEPHRINE 3 ML: 20; 10 INJECTION, SOLUTION INFILTRATION; PERINEURAL at 12:15

## 2022-04-18 ASSESSMENT — LIFESTYLE VARIABLES
TOTAL SCORE: 0
EVER HAD A DRINK FIRST THING IN THE MORNING TO STEADY YOUR NERVES TO GET RID OF A HANGOVER: NO
EVER FELT BAD OR GUILTY ABOUT YOUR DRINKING: NO
DOES PATIENT WANT TO STOP DRINKING: NO
DO YOU DRINK ALCOHOL: NO
CONSUMPTION TOTAL: INCOMPLETE
TOTAL SCORE: 0
HAVE PEOPLE ANNOYED YOU BY CRITICIZING YOUR DRINKING: NO
HAVE YOU EVER FELT YOU SHOULD CUT DOWN ON YOUR DRINKING: NO
TOTAL SCORE: 0

## 2022-04-18 NOTE — DISCHARGE INSTRUCTIONS
Follow-up with primary care or emergency department in 2 days for reevaluation and wound check.  Primary care should also refer you to dermatology or general surgery for definitive treatment, excision of recurrent infected sebaceous cyst.    Doxycycline twice daily for 7 days.  Tylenol or ibuprofen as needed for discomfort.    Keep packing in place until seen for reevaluation.  If packing falls out, cleanse gently with warm water, soap, pat dry twice daily.  Apply antibiotic ointment twice daily.  Keep covered while outdoors or active.    Return to the emergency department immediately for increased pain, swelling, bleeding or other drainage, fever or other new concerns.

## 2022-04-18 NOTE — ED PROVIDER NOTES
"ED Provider Note    CHIEF COMPLAINT  Chief Complaint   Patient presents with   • Wound Check     Pt has quarter sized mass near his R jaw on his neck. Pt thinks it was an ingrown hair that has progressively gotten worse over the last week. Pt tried to pop it       HPI  Luis Manuel Hernandez is a 41 y.o. male who presents to the emergency department through triage for infection at right face.  Patient states he has had similar prior requiring drainage and antibiotics.  Persistent over the last week.  Mild drainage but this stopped.  No difficulty swallowing or breathing.  No pain with mastication.  No fever or chills.  No nausea or vomiting.    REVIEW OF SYSTEMS  See HPI for further details. All other systems are negative.     PAST MEDICAL HISTORY   has a past medical history of Brain aneurysm, Memory loss, Seizure (HCC), and Stroke (HCC).    SOCIAL HISTORY  Social History     Tobacco Use   • Smoking status: Current Every Day Smoker     Packs/day: 0.50     Years: 20.00     Pack years: 10.00     Types: Cigarettes   • Smokeless tobacco: Never Used   Substance and Sexual Activity   • Alcohol use: Yes     Alcohol/week: 0.6 oz     Types: 1 Glasses of wine per week   • Drug use: Yes     Types: Marijuana     Comment: speed, meth, and cocaine last used 15 years ago, never IV   • Sexual activity: Not Currently     Partners: Female     Birth control/protection: Condom       SURGICAL HISTORY   has a past surgical history that includes craniotomy aneurysm (Left) and facial fracture orif.    CURRENT MEDICATIONS  Home Medications     Reviewed by Sierra Alvarez R.N. (Registered Nurse) on 04/18/22 at 1055  Med List Status: Complete   Medication Last Dose Status        Patient Jaden Taking any Medications                       ALLERGIES  No Known Allergies    PHYSICAL EXAM  VITAL SIGNS: /84   Pulse 81   Temp 36.9 °C (98.4 °F)   Resp 16   Ht 1.803 m (5' 11\")   Wt 62.5 kg (137 lb 12.6 oz)   SpO2 98%   BMI 19.22 kg/m²   Pulse " ox interpretation: I interpret this pulse ox as normal.  Constitutional: Alert in no apparent distress.  HENT: Normocephalic, atraumatic. Bilateral external ears normal, Nose normal. Moist mucous membranes.  1 x 2 cm fluctuant lesion just inferior to the angle of the right mandible with in the beard line.  Some crusting over the most fluctuant portion.  Mild tenderness.  Minimal erythema.  Eyes: Pupils are equal and reactive, Conjunctiva normal.   Neck: Normal range of motion, Supple  Lymphatic: No lymphadenopathy noted.  No cervical or submandibular lymphadenopathy.  Cardiovascular: Normal peripheral perfusion.  Thorax & Lungs: Nonlabored respirations.  Skin: Warm, Dry  Musculoskeletal: Good range of motion in all major joints.  Neurologic: Alert and orient x4.  Ambulates dependently.  Psychiatric: Affect normal, Judgment normal, Mood normal.       DIAGNOSTIC STUDIES / PROCEDURES    INCISION AND DRAINAGE PROCEDURE NOTE:  Patient identification was confirmed and consent was obtained.  This procedure was performed by   Site: Right inferior mandible  Sterile procedures observed  Needle size: 27  Anesthetic used (type and amt): 3 cc lidocaine with epinephrine  Blade size: 11  Drainage: Purulent, thick, chunky  Packing used: Quarter inch plain  Site anesthetized, incision made over site, wound drained and explored loculations, rinsed with copious amounts of normal saline, wound packed with sterile gauze, covered with dry, sterile dressing. Pt tolerated procedure well without complications. Instructions for care discussed verbally and pt provided with additional written instructions for homecare and f/u.      COURSE & MEDICAL DECISION MAKING  ED evaluation most suggestive of infected sebaceous cyst given discharge material.  I&D with packing placed as described above.  Patient tolerated procedure well.  No bleeding or soiled bandage thereafter.  No facial cellulitis.  This is recurrent for patient.  Encouraged to  follow-up with primary care for referral to dermatology or general surgery for definitive treatment and excision.  Doxycycline for 7 days.      Patient is stable for discharge at this time, anticipatory guidance and wound care instructions provided, doxycycline for 7 days.  Tylenol or ibuprofen for discomfort., close follow-up is encouraged with primary care or ED in 48 hours for wound check, and strict ED return instructions have been detailed. Patient is agreeable to the disposition and plan.      FINAL IMPRESSION  (L72.3,  L08.9) Infected sebaceous cyst      Electronically signed by: Jessica Rios D.O., 4/18/2022 12:26 PM      This dictation was created using voice recognition software. The accuracy of the dictation is limited to the abilities of the software. I expect there may be some errors of grammar and possibly content. The nursing notes were reviewed and certain aspects of this information were incorporated into this note.

## 2022-04-18 NOTE — ED NOTES
"Luis Manuel Hernandez D/C'd.  Discharge instructions including s/s to return to ED, follow up appointments, and antibiotic education  provided to pt.    Pt verbalized understanding with no further questions and concerns.    Copy of discharge provided to pt.  Signed copy in chart.    Prescription for doxycycline provided to pt.   Pt ambulates out of department; pt in NAD, awake, alert, and oriented.  VS /76   Pulse 78   Temp 36.8 °C (98.3 °F) (Temporal)   Resp 16   Ht 1.803 m (5' 11\")   Wt 62.5 kg (137 lb 12.6 oz)   SpO2 99%   BMI 19.22 kg/m²       "

## 2022-04-18 NOTE — ED TRIAGE NOTES
"Chief Complaint   Patient presents with   • Wound Check     Pt has quarter sized mass near his R jaw on his neck. Pt thinks it was an ingrown hair that has progressively gotten worse over the last week. Pt tried to pop it       Pt walk in for above. Pt denies any fevers or chills or difficulty swallowing. Pt aox4, gCS 15.      /84   Pulse 81   Temp 36.9 °C (98.4 °F)   Resp 16   Ht 1.803 m (5' 11\")   Wt 62.5 kg (137 lb 12.6 oz)   SpO2 98%   BMI 19.22 kg/m²     "

## 2022-04-22 ENCOUNTER — HOSPITAL ENCOUNTER (EMERGENCY)
Facility: MEDICAL CENTER | Age: 42
End: 2022-04-22
Attending: EMERGENCY MEDICINE
Payer: MEDICAID

## 2022-04-22 VITALS
HEIGHT: 71 IN | BODY MASS INDEX: 19.18 KG/M2 | WEIGHT: 137 LBS | RESPIRATION RATE: 16 BRPM | HEART RATE: 73 BPM | OXYGEN SATURATION: 99 % | SYSTOLIC BLOOD PRESSURE: 126 MMHG | TEMPERATURE: 98.9 F | DIASTOLIC BLOOD PRESSURE: 77 MMHG

## 2022-04-22 DIAGNOSIS — L72.3 SEBACEOUS CYST: ICD-10-CM

## 2022-04-22 DIAGNOSIS — Z51.89 ENCOUNTER FOR WOUND RE-CHECK: ICD-10-CM

## 2022-04-22 PROCEDURE — 99282 EMERGENCY DEPT VISIT SF MDM: CPT

## 2022-04-22 NOTE — ED NOTES
Pt ambulated with a normal, steady gait to the room from the lobby.   Agree with triage note. Pt states that he has minor wound pain.

## 2022-04-22 NOTE — ED TRIAGE NOTES
Luis Manuel Hernandez  41 y.o. male  Chief Complaint   Patient presents with   • Follow-Up     States told to return in 2 days for re-check of wound. Had cyst drained on Monday. Reports mild itching and intermittent pain to area. Denies increasing redness or drainage or fevers. Currently taking doxy as prescribed.    • Wound Check     Pt presents to triage for above complaint.     Triage process explained to patient, returned to lobby. Pt encouraged to notify staff of any change in condition.

## 2022-04-22 NOTE — ED PROVIDER NOTES
"ED Provider Note    CHIEF COMPLAINT  Chief Complaint   Patient presents with   • Follow-Up     States told to return in 2 days for re-check of wound. Had cyst drained on Monday. Reports mild itching and intermittent pain to area. Denies increasing redness or drainage or fevers. Currently taking doxy as prescribed.    • Wound Check       HPI  Luis Manuel Hernandez is a 41 y.o. male who presents for evaluation 2 days status post I&D of an infected sebaceous cyst.  Since the procedure he has had no more drainage, decreasing redness.  He has been on his antibiotics as prescribed.  No other acute complaints.  He is requesting a referral to have this cyst removed once the infection is fully resolved.    REVIEW OF SYSTEMS  Negative for fever, vomiting.    PAST MEDICAL HISTORY   has a past medical history of Brain aneurysm, Memory loss, Seizure (HCC), and Stroke (HCC).    SOCIAL HISTORY  Social History     Tobacco Use   • Smoking status: Current Every Day Smoker     Packs/day: 0.50     Years: 20.00     Pack years: 10.00     Types: Cigarettes   • Smokeless tobacco: Never Used   Substance and Sexual Activity   • Alcohol use: Yes     Alcohol/week: 0.6 oz     Types: 1 Glasses of wine per week   • Drug use: Yes     Types: Marijuana     Comment: speed, meth, and cocaine last used 15 years ago, never IV   • Sexual activity: Not Currently     Partners: Female     Birth control/protection: Condom       SURGICAL HISTORY   has a past surgical history that includes craniotomy aneurysm (Left) and facial fracture orif.    CURRENT MEDICATIONS  I personally reviewed the medication list in the charting documentation.     ALLERGIES  No Known Allergies    PHYSICAL EXAM  VITAL SIGNS: /94   Pulse 83   Temp 36.6 °C (97.8 °F) (Temporal)   Resp 14   Ht 1.803 m (5' 11\")   Wt 62.1 kg (137 lb)   SpO2 99%   BMI 19.11 kg/m²   Constitutional: Well appearing patient in no acute distress.  Awake and alert, not toxic nor ill in appearance.  HENT: " Normocephalic, no obvious evidence of acute trauma.   Neck: Firm cystic minimally tender structure to the right lateral neck.  Central incision is scabbing.  There is no drainage.  No erythema.  Eyes: Conjunctiva normal, Non-icteric.   Chest: Normal nonlabored respirations.  Skin: The exposed portions of skin reveal no obvious rash or other abnormalities.  Musculoskeletal: No obvious restriction in the range of motion in all major joints.   Neurologic: Alert, No obvious focal deficits noted.   Psychiatric: Affect normal for clinical presentation    COURSE & MEDICAL DECISION MAKING  Pertinent Labs & Imaging studies reviewed. (See chart for details)    Encounter Summary: This is a very pleasant 41 y.o. male who unfortunately required evaluation in the emergency department today with improving infection of a sebaceous cyst on the left side of his neck, 2 days status post I&D.  He is on antibiotics.  Today this looks good.  He is asked me for referral for follow-up, I see a note from the prior physician that he was encouraged to follow-up with primary care for referral to dermatology or general surgery, I do not have a dermatologist on call here but I have referred him to the on-call general surgery.  Discharged home in stable condition      DISPOSITION: Discharge Home      FINAL IMPRESSION  1. Encounter for wound re-check    2. Sebaceous cyst        This dictation was created using voice recognition software. The accuracy of the dictation is limited to the abilities of the software. I expect there may be some errors of grammar and possibly content. The nursing notes were reviewed and certain aspects of this information were incorporated into this note.    Electronically signed by: Ton Pitts M.D., 4/22/2022 10:32 AM

## 2023-07-25 ENCOUNTER — HOSPITAL ENCOUNTER (OUTPATIENT)
Facility: MEDICAL CENTER | Age: 43
End: 2023-07-27
Attending: EMERGENCY MEDICINE | Admitting: HOSPITALIST
Payer: MEDICAID

## 2023-07-25 ENCOUNTER — APPOINTMENT (OUTPATIENT)
Dept: RADIOLOGY | Facility: MEDICAL CENTER | Age: 43
End: 2023-07-25
Attending: EMERGENCY MEDICINE
Payer: MEDICAID

## 2023-07-25 DIAGNOSIS — K83.1 BILIARY OBSTRUCTION: ICD-10-CM

## 2023-07-25 DIAGNOSIS — Z90.49 STATUS POST LAPAROSCOPIC CHOLECYSTECTOMY: ICD-10-CM

## 2023-07-25 DIAGNOSIS — R79.89 ELEVATED LFTS: ICD-10-CM

## 2023-07-25 DIAGNOSIS — K81.9 CHOLECYSTITIS WITHOUT CHOLELITHIASIS: ICD-10-CM

## 2023-07-25 PROBLEM — F41.9 ANXIETY: Status: ACTIVE | Noted: 2023-07-25

## 2023-07-25 PROBLEM — K80.20 CHOLELITHIASIS WITHOUT CHOLANGITIS: Status: ACTIVE | Noted: 2023-07-25

## 2023-07-25 LAB
ALBUMIN SERPL BCP-MCNC: 4.9 G/DL (ref 3.2–4.9)
ALBUMIN/GLOB SERPL: 2 G/DL
ALP SERPL-CCNC: 171 U/L (ref 30–99)
ALT SERPL-CCNC: 296 U/L (ref 2–50)
ANION GAP SERPL CALC-SCNC: 14 MMOL/L (ref 7–16)
AST SERPL-CCNC: 283 U/L (ref 12–45)
BASOPHILS # BLD AUTO: 0.6 % (ref 0–1.8)
BASOPHILS # BLD: 0.05 K/UL (ref 0–0.12)
BILIRUB SERPL-MCNC: 2.8 MG/DL (ref 0.1–1.5)
BUN SERPL-MCNC: 8 MG/DL (ref 8–22)
CALCIUM ALBUM COR SERPL-MCNC: 9 MG/DL (ref 8.5–10.5)
CALCIUM SERPL-MCNC: 9.7 MG/DL (ref 8.5–10.5)
CHLORIDE SERPL-SCNC: 100 MMOL/L (ref 96–112)
CO2 SERPL-SCNC: 23 MMOL/L (ref 20–33)
CREAT SERPL-MCNC: 0.67 MG/DL (ref 0.5–1.4)
D DIMER PPP IA.FEU-MCNC: 0.37 UG/ML (FEU) (ref 0–0.5)
EKG IMPRESSION: NORMAL
EOSINOPHIL # BLD AUTO: 0.02 K/UL (ref 0–0.51)
EOSINOPHIL NFR BLD: 0.2 % (ref 0–6.9)
ERYTHROCYTE [DISTWIDTH] IN BLOOD BY AUTOMATED COUNT: 42.5 FL (ref 35.9–50)
GFR SERPLBLD CREATININE-BSD FMLA CKD-EPI: 118 ML/MIN/1.73 M 2
GLOBULIN SER CALC-MCNC: 2.4 G/DL (ref 1.9–3.5)
GLUCOSE SERPL-MCNC: 134 MG/DL (ref 65–99)
HCT VFR BLD AUTO: 46.9 % (ref 42–52)
HGB BLD-MCNC: 16 G/DL (ref 14–18)
IMM GRANULOCYTES # BLD AUTO: 0.03 K/UL (ref 0–0.11)
IMM GRANULOCYTES NFR BLD AUTO: 0.3 % (ref 0–0.9)
LIPASE SERPL-CCNC: 26 U/L (ref 11–82)
LYMPHOCYTES # BLD AUTO: 0.57 K/UL (ref 1–4.8)
LYMPHOCYTES NFR BLD: 6.4 % (ref 22–41)
MCH RBC QN AUTO: 30.1 PG (ref 27–33)
MCHC RBC AUTO-ENTMCNC: 34.1 G/DL (ref 32.3–36.5)
MCV RBC AUTO: 88.3 FL (ref 81.4–97.8)
MONOCYTES # BLD AUTO: 0.44 K/UL (ref 0–0.85)
MONOCYTES NFR BLD AUTO: 4.9 % (ref 0–13.4)
NEUTROPHILS # BLD AUTO: 7.81 K/UL (ref 1.82–7.42)
NEUTROPHILS NFR BLD: 87.6 % (ref 44–72)
NRBC # BLD AUTO: 0 K/UL
NRBC BLD-RTO: 0 /100 WBC (ref 0–0.2)
PLATELET # BLD AUTO: 292 K/UL (ref 164–446)
PMV BLD AUTO: 9.7 FL (ref 9–12.9)
POTASSIUM SERPL-SCNC: 4.1 MMOL/L (ref 3.6–5.5)
PROT SERPL-MCNC: 7.3 G/DL (ref 6–8.2)
RBC # BLD AUTO: 5.31 M/UL (ref 4.7–6.1)
SODIUM SERPL-SCNC: 137 MMOL/L (ref 135–145)
TROPONIN T SERPL-MCNC: 7 NG/L (ref 6–19)
WBC # BLD AUTO: 8.9 K/UL (ref 4.8–10.8)

## 2023-07-25 PROCEDURE — 71045 X-RAY EXAM CHEST 1 VIEW: CPT

## 2023-07-25 PROCEDURE — 80053 COMPREHEN METABOLIC PANEL: CPT

## 2023-07-25 PROCEDURE — 99254 IP/OBS CNSLTJ NEW/EST MOD 60: CPT | Performed by: SPECIALIST

## 2023-07-25 PROCEDURE — 700111 HCHG RX REV CODE 636 W/ 250 OVERRIDE (IP): Mod: UD | Performed by: EMERGENCY MEDICINE

## 2023-07-25 PROCEDURE — 99244 OFF/OP CNSLTJ NEW/EST MOD 40: CPT | Performed by: SURGERY

## 2023-07-25 PROCEDURE — 83690 ASSAY OF LIPASE: CPT

## 2023-07-25 PROCEDURE — 96375 TX/PRO/DX INJ NEW DRUG ADDON: CPT

## 2023-07-25 PROCEDURE — 99285 EMERGENCY DEPT VISIT HI MDM: CPT

## 2023-07-25 PROCEDURE — 700105 HCHG RX REV CODE 258: Mod: UD | Performed by: HOSPITALIST

## 2023-07-25 PROCEDURE — 96365 THER/PROPH/DIAG IV INF INIT: CPT

## 2023-07-25 PROCEDURE — 99222 1ST HOSP IP/OBS MODERATE 55: CPT | Mod: 25 | Performed by: HOSPITALIST

## 2023-07-25 PROCEDURE — G0378 HOSPITAL OBSERVATION PER HR: HCPCS

## 2023-07-25 PROCEDURE — 85379 FIBRIN DEGRADATION QUANT: CPT

## 2023-07-25 PROCEDURE — 93005 ELECTROCARDIOGRAM TRACING: CPT | Performed by: EMERGENCY MEDICINE

## 2023-07-25 PROCEDURE — 99406 BEHAV CHNG SMOKING 3-10 MIN: CPT | Performed by: HOSPITALIST

## 2023-07-25 PROCEDURE — 36415 COLL VENOUS BLD VENIPUNCTURE: CPT

## 2023-07-25 PROCEDURE — 76705 ECHO EXAM OF ABDOMEN: CPT

## 2023-07-25 PROCEDURE — 84484 ASSAY OF TROPONIN QUANT: CPT

## 2023-07-25 PROCEDURE — 85025 COMPLETE CBC W/AUTO DIFF WBC: CPT

## 2023-07-25 RX ORDER — LORAZEPAM 2 MG/ML
1 INJECTION INTRAMUSCULAR
Status: COMPLETED | OUTPATIENT
Start: 2023-07-25 | End: 2023-07-26

## 2023-07-25 RX ORDER — CEFTRIAXONE 2 G/1
2000 INJECTION, POWDER, FOR SOLUTION INTRAMUSCULAR; INTRAVENOUS ONCE
Status: COMPLETED | OUTPATIENT
Start: 2023-07-25 | End: 2023-07-25

## 2023-07-25 RX ORDER — PROMETHAZINE HYDROCHLORIDE 25 MG/1
12.5-25 SUPPOSITORY RECTAL EVERY 4 HOURS PRN
Status: DISCONTINUED | OUTPATIENT
Start: 2023-07-25 | End: 2023-07-27 | Stop reason: HOSPADM

## 2023-07-25 RX ORDER — MORPHINE SULFATE 4 MG/ML
2 INJECTION INTRAVENOUS EVERY 4 HOURS PRN
Status: DISCONTINUED | OUTPATIENT
Start: 2023-07-26 | End: 2023-07-27 | Stop reason: HOSPADM

## 2023-07-25 RX ORDER — PROCHLORPERAZINE EDISYLATE 5 MG/ML
5-10 INJECTION INTRAMUSCULAR; INTRAVENOUS EVERY 4 HOURS PRN
Status: DISCONTINUED | OUTPATIENT
Start: 2023-07-25 | End: 2023-07-27 | Stop reason: HOSPADM

## 2023-07-25 RX ORDER — BISACODYL 10 MG
10 SUPPOSITORY, RECTAL RECTAL
Status: DISCONTINUED | OUTPATIENT
Start: 2023-07-25 | End: 2023-07-25

## 2023-07-25 RX ORDER — PROMETHAZINE HYDROCHLORIDE 25 MG/1
12.5-25 TABLET ORAL EVERY 4 HOURS PRN
Status: DISCONTINUED | OUTPATIENT
Start: 2023-07-25 | End: 2023-07-27 | Stop reason: HOSPADM

## 2023-07-25 RX ORDER — ONDANSETRON 2 MG/ML
4 INJECTION INTRAMUSCULAR; INTRAVENOUS ONCE
Status: COMPLETED | OUTPATIENT
Start: 2023-07-25 | End: 2023-07-25

## 2023-07-25 RX ORDER — MORPHINE SULFATE 4 MG/ML
4 INJECTION INTRAVENOUS ONCE
Status: COMPLETED | OUTPATIENT
Start: 2023-07-25 | End: 2023-07-25

## 2023-07-25 RX ORDER — ONDANSETRON 4 MG/1
4 TABLET, ORALLY DISINTEGRATING ORAL EVERY 4 HOURS PRN
Status: DISCONTINUED | OUTPATIENT
Start: 2023-07-25 | End: 2023-07-27 | Stop reason: HOSPADM

## 2023-07-25 RX ORDER — POLYETHYLENE GLYCOL 3350 17 G/17G
1 POWDER, FOR SOLUTION ORAL
Status: DISCONTINUED | OUTPATIENT
Start: 2023-07-25 | End: 2023-07-25

## 2023-07-25 RX ORDER — AMOXICILLIN 250 MG
2 CAPSULE ORAL 2 TIMES DAILY
Status: DISCONTINUED | OUTPATIENT
Start: 2023-07-25 | End: 2023-07-25

## 2023-07-25 RX ORDER — SODIUM CHLORIDE 9 MG/ML
INJECTION, SOLUTION INTRAVENOUS CONTINUOUS
Status: DISCONTINUED | OUTPATIENT
Start: 2023-07-25 | End: 2023-07-27 | Stop reason: HOSPADM

## 2023-07-25 RX ORDER — ONDANSETRON 2 MG/ML
4 INJECTION INTRAMUSCULAR; INTRAVENOUS EVERY 4 HOURS PRN
Status: DISCONTINUED | OUTPATIENT
Start: 2023-07-25 | End: 2023-07-27 | Stop reason: HOSPADM

## 2023-07-25 RX ORDER — METRONIDAZOLE 500 MG/100ML
500 INJECTION, SOLUTION INTRAVENOUS EVERY 6 HOURS
Status: COMPLETED | OUTPATIENT
Start: 2023-07-25 | End: 2023-07-25

## 2023-07-25 RX ADMIN — MORPHINE SULFATE 4 MG: 4 INJECTION, SOLUTION INTRAMUSCULAR; INTRAVENOUS at 18:06

## 2023-07-25 RX ADMIN — CEFTRIAXONE SODIUM 2000 MG: 2 INJECTION, POWDER, FOR SOLUTION INTRAMUSCULAR; INTRAVENOUS at 20:24

## 2023-07-25 RX ADMIN — METRONIDAZOLE 500 MG: 5 INJECTION, SOLUTION INTRAVENOUS at 20:24

## 2023-07-25 RX ADMIN — ONDANSETRON 4 MG: 2 INJECTION INTRAMUSCULAR; INTRAVENOUS at 18:06

## 2023-07-25 RX ADMIN — SODIUM CHLORIDE: 9 INJECTION, SOLUTION INTRAVENOUS at 21:15

## 2023-07-25 ASSESSMENT — LIFESTYLE VARIABLES
DOES PATIENT WANT TO STOP DRINKING: CANNOT ASSESS
EVER FELT BAD OR GUILTY ABOUT YOUR DRINKING: NO
ALCOHOL_USE: NO
CONSUMPTION TOTAL: NEGATIVE
HOW MANY TIMES IN THE PAST YEAR HAVE YOU HAD 5 OR MORE DRINKS IN A DAY: 0
TOTAL SCORE: 0
AVERAGE NUMBER OF DAYS PER WEEK YOU HAVE A DRINK CONTAINING ALCOHOL: 0
EVER HAD A DRINK FIRST THING IN THE MORNING TO STEADY YOUR NERVES TO GET RID OF A HANGOVER: NO
HAVE PEOPLE ANNOYED YOU BY CRITICIZING YOUR DRINKING: NO
TOTAL SCORE: 0
TOTAL SCORE: 0
ON A TYPICAL DAY WHEN YOU DRINK ALCOHOL HOW MANY DRINKS DO YOU HAVE: 0
HAVE YOU EVER FELT YOU SHOULD CUT DOWN ON YOUR DRINKING: NO

## 2023-07-25 ASSESSMENT — PATIENT HEALTH QUESTIONNAIRE - PHQ9
2. FEELING DOWN, DEPRESSED, IRRITABLE, OR HOPELESS: NOT AT ALL
SUM OF ALL RESPONSES TO PHQ9 QUESTIONS 1 AND 2: 0
1. LITTLE INTEREST OR PLEASURE IN DOING THINGS: NOT AT ALL

## 2023-07-25 ASSESSMENT — HEART SCORE
AGE: <45
HEART SCORE: 1
TROPONIN: LESS THAN OR EQUAL TO NORMAL LIMIT
RISK FACTORS: NO KNOWN RISK FACTORS
HISTORY: SLIGHTLY SUSPICIOUS
ECG: NON-SPECIFIC REPOLARIZATION DISTURBANCE

## 2023-07-25 ASSESSMENT — PAIN DESCRIPTION - PAIN TYPE: TYPE: ACUTE PAIN

## 2023-07-25 ASSESSMENT — ENCOUNTER SYMPTOMS
ABDOMINAL PAIN: 1
NEUROLOGICAL NEGATIVE: 1
MUSCULOSKELETAL NEGATIVE: 1
RESPIRATORY NEGATIVE: 1
CONSTITUTIONAL NEGATIVE: 1
PSYCHIATRIC NEGATIVE: 1
EYES NEGATIVE: 1

## 2023-07-25 ASSESSMENT — FIBROSIS 4 INDEX: FIB4 SCORE: 2.48

## 2023-07-26 ENCOUNTER — APPOINTMENT (OUTPATIENT)
Dept: RADIOLOGY | Facility: MEDICAL CENTER | Age: 43
End: 2023-07-26
Attending: HOSPITALIST
Payer: MEDICAID

## 2023-07-26 PROBLEM — Z78.9 NO CONTRAINDICATION TO DEEP VEIN THROMBOSIS (DVT) PROPHYLAXIS: Status: ACTIVE | Noted: 2023-07-26

## 2023-07-26 LAB
ALBUMIN SERPL BCP-MCNC: 4.1 G/DL (ref 3.2–4.9)
ALBUMIN/GLOB SERPL: 2.2 G/DL
ALP SERPL-CCNC: 148 U/L (ref 30–99)
ALT SERPL-CCNC: 307 U/L (ref 2–50)
ANION GAP SERPL CALC-SCNC: 10 MMOL/L (ref 7–16)
AST SERPL-CCNC: 256 U/L (ref 12–45)
BILIRUB SERPL-MCNC: 2.4 MG/DL (ref 0.1–1.5)
BUN SERPL-MCNC: 9 MG/DL (ref 8–22)
CALCIUM ALBUM COR SERPL-MCNC: 8.6 MG/DL (ref 8.5–10.5)
CALCIUM SERPL-MCNC: 8.7 MG/DL (ref 8.5–10.5)
CHLORIDE SERPL-SCNC: 102 MMOL/L (ref 96–112)
CO2 SERPL-SCNC: 26 MMOL/L (ref 20–33)
CREAT SERPL-MCNC: 0.58 MG/DL (ref 0.5–1.4)
ERYTHROCYTE [DISTWIDTH] IN BLOOD BY AUTOMATED COUNT: 41.6 FL (ref 35.9–50)
GFR SERPLBLD CREATININE-BSD FMLA CKD-EPI: 123 ML/MIN/1.73 M 2
GLOBULIN SER CALC-MCNC: 1.9 G/DL (ref 1.9–3.5)
GLUCOSE SERPL-MCNC: 108 MG/DL (ref 65–99)
HCT VFR BLD AUTO: 40.8 % (ref 42–52)
HGB BLD-MCNC: 14.2 G/DL (ref 14–18)
MCH RBC QN AUTO: 30.6 PG (ref 27–33)
MCHC RBC AUTO-ENTMCNC: 34.8 G/DL (ref 32.3–36.5)
MCV RBC AUTO: 87.9 FL (ref 81.4–97.8)
PLATELET # BLD AUTO: 276 K/UL (ref 164–446)
PMV BLD AUTO: 9.5 FL (ref 9–12.9)
POTASSIUM SERPL-SCNC: 4.1 MMOL/L (ref 3.6–5.5)
PROT SERPL-MCNC: 6 G/DL (ref 6–8.2)
RBC # BLD AUTO: 4.64 M/UL (ref 4.7–6.1)
SODIUM SERPL-SCNC: 138 MMOL/L (ref 135–145)
TROPONIN T SERPL-MCNC: 6 NG/L (ref 6–19)
WBC # BLD AUTO: 6.7 K/UL (ref 4.8–10.8)

## 2023-07-26 PROCEDURE — 99232 SBSQ HOSP IP/OBS MODERATE 35: CPT | Performed by: INTERNAL MEDICINE

## 2023-07-26 PROCEDURE — 85027 COMPLETE CBC AUTOMATED: CPT

## 2023-07-26 PROCEDURE — 80053 COMPREHEN METABOLIC PANEL: CPT

## 2023-07-26 PROCEDURE — 99232 SBSQ HOSP IP/OBS MODERATE 35: CPT | Mod: 57 | Performed by: NURSE PRACTITIONER

## 2023-07-26 PROCEDURE — 700105 HCHG RX REV CODE 258: Mod: UD | Performed by: HOSPITALIST

## 2023-07-26 PROCEDURE — 96376 TX/PRO/DX INJ SAME DRUG ADON: CPT

## 2023-07-26 PROCEDURE — 700111 HCHG RX REV CODE 636 W/ 250 OVERRIDE (IP): Mod: JZ,UD | Performed by: HOSPITALIST

## 2023-07-26 PROCEDURE — G0378 HOSPITAL OBSERVATION PER HR: HCPCS

## 2023-07-26 PROCEDURE — 74181 MRI ABDOMEN W/O CONTRAST: CPT

## 2023-07-26 PROCEDURE — 84484 ASSAY OF TROPONIN QUANT: CPT

## 2023-07-26 PROCEDURE — 96375 TX/PRO/DX INJ NEW DRUG ADDON: CPT

## 2023-07-26 RX ORDER — ENOXAPARIN SODIUM 100 MG/ML
40 INJECTION SUBCUTANEOUS DAILY
Status: DISCONTINUED | OUTPATIENT
Start: 2023-07-26 | End: 2023-07-27 | Stop reason: HOSPADM

## 2023-07-26 RX ADMIN — SODIUM CHLORIDE: 9 INJECTION, SOLUTION INTRAVENOUS at 17:46

## 2023-07-26 RX ADMIN — LORAZEPAM 1 MG: 2 INJECTION INTRAMUSCULAR; INTRAVENOUS at 08:05

## 2023-07-26 RX ADMIN — MORPHINE SULFATE 2 MG: 4 INJECTION, SOLUTION INTRAMUSCULAR; INTRAVENOUS at 19:18

## 2023-07-26 ASSESSMENT — ENCOUNTER SYMPTOMS
ABDOMINAL PAIN: 1
WEAKNESS: 0
NAUSEA: 0
VOMITING: 0
HEADACHES: 0
MYALGIAS: 0
DIARRHEA: 0
COUGH: 0
FEVER: 0
SHORTNESS OF BREATH: 0
SORE THROAT: 0
CHILLS: 0

## 2023-07-26 ASSESSMENT — PAIN DESCRIPTION - PAIN TYPE
TYPE: ACUTE PAIN
TYPE: ACUTE PAIN

## 2023-07-26 NOTE — ASSESSMENT & PLAN NOTE
No fever or leukocytosis  MRCP - no evidence biliary dilation or bile duct stone - discussed with GI, no need for ERCP  Discussed with general surgery, plan is for lap chiqui in AM  Clear liquid diet  NPO at midnight  IVF while NPO  Follow LFTs

## 2023-07-26 NOTE — CONSULTS
DATE OF CONSULTATION:  7/25/2023     REFERRING PHYSICIAN:   Alla Stafford M.D.     CONSULTING PHYSICIAN:  Diaz Whitt M.D.     REASON FOR CONSULTATION:  I have been asked by  to see the patient in surgical consultation for evaluation of choledocholithiasis.    HISTORY OF PRESENT ILLNESS: The patient is a 42 year-old White man who presents to the Emergency Department with a 1- day history of mild and moderate right upper quadrant  abdominal pain. The pain radiates into his back.  The pain is associated with anorexia, diarrhea, and malaise. The patient denies any recent or intercurrent illness. The patient denies any history of previous abdominal surgery. The patient denies any previous surgery for obstructive symptoms..     PAST MEDICAL HISTORY:  has a past medical history of Brain aneurysm, Memory loss, Seizure (HCC), and Stroke (HCC).    PAST SURGICAL HISTORY:  has a past surgical history that includes craniotomy aneurysm (Left) and facial fracture orif.    ALLERGIES: No Known Allergies    CURRENT MEDICATIONS:    Home Medications       Reviewed by Susan Luna R.N. (Registered Nurse) on 07/25/23 at 1700  Med List Status: Complete     Medication Last Dose Status        Patient Jaden Taking any Medications                           FAMILY HISTORY: family history includes Cancer (age of onset: 68) in his maternal grandfather; Heart Disease in his maternal grandfather and maternal grandmother.    SOCIAL HISTORY:  reports that he has been smoking cigarettes. He has a 10.00 pack-year smoking history. He has never used smokeless tobacco. He reports that he does not currently use alcohol after a past usage of about 0.6 oz of alcohol per week. He reports current drug use. Drug: Marijuana.    REVIEW OF SYSTEMS: Comprehensive review of systems is negative with the exception of the aforementioned HPI, PMH, and PSH bullets in accordance with CMS guidelines.    PHYSICAL EXAMINATION:    Physical  Exam  Vitals and nursing note reviewed.   Constitutional:       General: He is not in acute distress.     Appearance: Normal appearance. He is not toxic-appearing.   HENT:      Head: Normocephalic.      Mouth/Throat:      Mouth: Mucous membranes are moist.   Eyes:      General: No scleral icterus.     Pupils: Pupils are equal, round, and reactive to light.   Cardiovascular:      Rate and Rhythm: Normal rate and regular rhythm.      Pulses: Normal pulses.   Pulmonary:      Effort: Pulmonary effort is normal. No respiratory distress.      Breath sounds: Normal breath sounds.   Abdominal:      General: Abdomen is flat. Bowel sounds are normal. There is no distension.      Tenderness: There is no guarding or rebound.      Hernia: No hernia is present.   Musculoskeletal:         General: No swelling or tenderness. Normal range of motion.   Skin:     General: Skin is warm and dry.      Capillary Refill: Capillary refill takes less than 2 seconds.   Neurological:      General: No focal deficit present.      Mental Status: He is alert and oriented to person, place, and time.         LABORATORY VALUES:   Recent Labs     07/25/23  1815   WBC 8.9   RBC 5.31   HEMOGLOBIN 16.0   HEMATOCRIT 46.9   MCV 88.3   MCH 30.1   MCHC 34.1   RDW 42.5   PLATELETCT 292   MPV 9.7     Recent Labs     07/25/23  1815   SODIUM 137   POTASSIUM 4.1   CHLORIDE 100   CO2 23   GLUCOSE 134*   BUN 8   CREATININE 0.67   CALCIUM 9.7     Recent Labs     07/25/23  1815   ASTSGOT 283*   ALTSGPT 296*   TBILIRUBIN 2.8*   ALKPHOSPHAT 171*   GLOBULIN 2.4            IMAGING:   US-RUQ   Final Result      1. Gallbladder sludge. No gallbladder wall thickening or pericholecystic fluid.   2. Mildly dilated main portal vein.         DX-CHEST-PORTABLE (1 VIEW)   Final Result         1. No acute cardiopulmonary abnormalities are identified.      [id number]/[id number]    (Results Pending)       ASSESSMENT AND PLAN:     * Cholelithiasis, possible choledocholithiasis without  cholangitis- (present on admission)  Assessment & Plan  IV abx  MRCP has been ordered  GI to consult - Dr. Elli Rizo WBC and LFTs  Plan lap chiqui prior to discharge     Anxiety- (present on admission)  Assessment & Plan  Patient will receive  Ativan prior to his MRI     Tobacco dependence- (present on admission)  Assessment & Plan  Patient refused nicotine patch    DISPOSITION: Medical evaluation and admission. The patient was admitted to the Medical Service prior to surgical consultation. [facility] Service will follow.       ____________________________________     Diaz Whitt M.D.    DD: 7/25/2023  9:15 PM    AAST Grading System for EGS Conditions  ACS NSQIP Surgical Risk Calculator

## 2023-07-26 NOTE — ED NOTES
Pt transferred out of ED at this time with [employer] tech via wheelchair. Pt is A&Ox4, with stable vital signs and no apparent distress upon transfer. All paperwork and personal belongings sent with pt to CDU.

## 2023-07-26 NOTE — DISCHARGE INSTRUCTIONS
Post Operative Discharge Instructions:    1. DIET: Upon discharge from the hospital, you may resume your normal preoperative diet, unless specifically directed otherwise. Depending on how you are feeling and whether you have nausea or not, you may wish to stay with a bland diet for the first few days. However, you can advance this as quickly as you feel ready.    2. ACTIVITIES: After discharge from the hospital, you may resume full routine activities; however, there should be no heavy lifting (greater than 20 pounds or a bag of groceries) and no strenuous activities for at least 2 weeks. The duration may be longer, depending on your surgical procedure. Routine activities of daily living are acceptable. Activity level should be addressed at your post-op follow up appointment.    3. DRIVING: You may drive whenever you are off pain medications and are able to perform the activities needed to drive, i.e., turning, bending, twisting, etc.    4. BATHING: You may get the wound wet at any time after leaving the hospital. You may shower, but do not submerge in a bath for at least two weeks.  If you have wound dressings, they may come off after 48 hours.  If you have skin glue to the wound, this will fall off on its own, do not pick at it.  If you have Steri-Strips to the wound, these will fall off on their own, do not pick at them. You may trim the edges if needed.    5. BOWEL FUNCTION:   After surgery, it is not uncommon for patients to develop either frequent or loose stools after meals. This usually corrects itself after a few days, to a few weeks. If this occurs, do not worry; this will resolve on its own.  Constipation is much more common than loose stools. The cause is the combination of pain medication and decreased activity level and possibly the nature of the surgical procedure performed. If you feel this is occurring, you may use an over-the-counter treatment such as MiraLAX (or Milk of Magnesia, Ex-Lax,  Senokot, etc.) until the problem has resolved. Drink plenty of water and try to wean off narcotic pain medications as soon as is comfortable for you.    6. PAIN MEDICATION:   You will be given a prescription for pain medication at discharge. Please take these as directed. It is important to remember not to take medications on an empty stomach as this may cause nausea.  You may also take over the counter acetaminophen and/or NSAIDS (ibuprofen, Aleve, Advil, Motrin) per the package instructions.  You may also use ice to the wound to decrease pain and swelling. You may alternate 20 minutes on and 20 minutes off with the ice for the first 24-48 hours. Make sure you place a washcloth or towel between the ice pack and your skin.  Please note that narcotic pain medication cannot be refilled unless you are seen by a doctor. Make sure you call the office if you are running low on medication or if the dose you have been prescribed is not working well for you.    7.CALL THE [place] [facility]ICE AT [phone] IF YOU HAVE:  (1) Fevers to more than 101F, (2) Unusual chest or leg pain, (3) Drainage or fluid from incision that may be foul smelling, increased tenderness or soreness at the wound or the wound edges are no longer together, redness or swelling at the incision site. Do not hesitate to call with any other questions.

## 2023-07-26 NOTE — ASSESSMENT & PLAN NOTE
7/26 MRCP with nondistended gallbladder with debris and/or sludge present.  Surrounding edema or fluid concerning for acute cholecystitis. No biliary dilation or common bile duct stone.  7/27 Plan for laparoscopic cholecystectomy.  [facility].

## 2023-07-26 NOTE — CARE PLAN
The patient is Watcher - Medium risk of patient condition declining or worsening    Shift Goals  Clinical Goals: Imaging w results  Patient Goals: same as above  Family Goals: NA    Progress made toward(s) clinical / shift goals:    Problem: Pain - Standard  Goal: Alleviation of pain or a reduction in pain to the patient’s comfort goal  Outcome: Progressing     Problem: Knowledge Deficit - Standard  Goal: Patient and family/care givers will demonstrate understanding of plan of care, disease process/condition, diagnostic tests and medications  Outcome: Progressing       Patient is not progressing towards the following goals:

## 2023-07-26 NOTE — CARE PLAN
The patient is Stable - Low risk of patient condition declining or worsening    Shift Goals  Clinical Goals: NPO strict, gen surg consult, MRCP  Patient Goals: Rest, pain control  Family Goals: LORENA      Problem: Pain - Standard  Goal: Alleviation of pain or a reduction in pain to the patient’s comfort goal  Outcome: Progressing     Problem: Knowledge Deficit - Standard  Goal: Patient and family/care givers will demonstrate understanding of plan of care, disease process/condition, diagnostic tests and medications  Outcome: Progressing

## 2023-07-26 NOTE — PROGRESS NOTES
Pt sleeping; no signs of distress. No change from previous assessment. Fall precautions in place. Will continue to monitor.

## 2023-07-26 NOTE — PROCEDURES
I have evaluated MRCP. No need for ERCp at this time. There is no evidence of biliary obstruction.    Jessica Calloway MD

## 2023-07-26 NOTE — PROGRESS NOTES
4 Eyes Skin Assessment Completed by CAROLINA Kaminski and CAROLINA Arreola.    Head WDL  Ears WDL  Nose WDL  Mouth WDL  Neck WDL  Breast/Chest WDL  Shoulder Blades WDL  Spine WDL  (R) Arm/Elbow/Hand WDL  (L) Arm/Elbow/Hand WDL  Abdomen WDL  Groin WDL  Scrotum/Coccyx/Buttocks WDL  (R) Leg WDL  (L) Leg WDL  (R) Heel/Foot/Toe WDL  (L) Heel/Foot/Toe WDL          Devices In Places Blood Pressure Cuff and Pulse Ox      Interventions In Place Pressure Redistribution Mattress    Possible Skin Injury No    Pictures Uploaded Into Epic N/A  Wound Consult Placed N/A  RN Wound Prevention Protocol Ordered No

## 2023-07-26 NOTE — PROGRESS NOTES
"  DATE: 7/26/2023    Hospital day 1 Cholelithiasis without cholangitis.    INTERVAL EVENTS:  Doing ok, feeling a little better.  MRCP negative for stones.    REVIEW OF SYSTEMS:  Review of Systems   Constitutional:  Positive for malaise/fatigue. Negative for chills and fever.   Respiratory:  Negative for shortness of breath.    Cardiovascular:  Negative for chest pain.   Gastrointestinal:  Positive for abdominal pain. Negative for nausea and vomiting.       PHYSICAL EXAMINATION:    Vital Signs: BP 98/62   Pulse (!) 54   Temp 36.2 °C (97.1 °F) (Temporal)   Resp 16   Ht 1.803 m (5' 11\")   Wt 53.8 kg (118 lb 9.7 oz)   SpO2 98%   Physical Exam  Vitals and nursing note reviewed.   Constitutional:       General: He is sleeping. He is not in acute distress.     Appearance: He is well-developed.   Pulmonary:      Effort: Pulmonary effort is normal. No respiratory distress.   Abdominal:      General: There is no distension.      Tenderness: There is abdominal tenderness. There is no guarding.   Musculoskeletal:      Comments: Moves all extremities   Skin:     General: Skin is warm and dry.   Neurological:      Mental Status: He is easily aroused. Mental status is at baseline.   Psychiatric:         Behavior: Behavior is cooperative.       LABORATORY VALUES:   Recent Labs     07/25/23 1815 07/26/23  0025   WBC 8.9 6.7   RBC 5.31 4.64*   HEMOGLOBIN 16.0 14.2   HEMATOCRIT 46.9 40.8*   MCV 88.3 87.9   MCH 30.1 30.6   MCHC 34.1 34.8   RDW 42.5 41.6   PLATELETCT 292 276   MPV 9.7 9.5     Recent Labs     07/25/23 1815 07/26/23  0025   SODIUM 137 138   POTASSIUM 4.1 4.1   CHLORIDE 100 102   CO2 23 26   GLUCOSE 134* 108*   BUN 8 9   CREATININE 0.67 0.58   CALCIUM 9.7 8.7     Recent Labs     07/25/23 1815 07/26/23  0025   ASTSGOT 283* 256*   ALTSGPT 296* 307*   TBILIRUBIN 2.8* 2.4*   ALKPHOSPHAT 171* 148*   GLOBULIN 2.4 1.9            IMAGING:   [id number]   Final Result      1.  Nondistended gallbladder with debris and/or " sludge present.  Surrounding edema or fluid concerning for acute cholecystitis.   2.  No biliary dilation or common bile duct stone.   3.  Stomach is distended with fluid.      US-RUQ   Final Result      1. Gallbladder sludge. No gallbladder wall thickening or pericholecystic fluid.   2. Mildly dilated main portal vein.         DX-CHEST-PORTABLE (1 VIEW)   Final Result         1. No acute cardiopulmonary abnormalities are identified.          ASSESSMENT AND PLAN:     * Cholelithiasis without cholangitis- (present on admission)  Assessment & Plan  7/26 MRCP with nondistended gallbladder with debris and/or sludge present.  Surrounding edema or fluid concerning for acute cholecystitis. No biliary dilation or common bile duct stone.  7/27 Plan for laparoscopic cholecystectomy.  [facility] [address] [facility].    No contraindication to deep vein thrombosis (DVT) prophylaxis- (present on admission)  Assessment & Plan  7/26 Ok for chemical DVT prophylaxis.      NPO at [facility] for lap chiqui tomorrow.       ____________________________________     PRICE Rolon    DD: 7/26/2023  12:04 PM

## 2023-07-26 NOTE — CONSULTS
GASTROENTEROLOGY CONSULTATION    PATIENT NAME: Luis Manuel Hernandez  : 1980  CSN: [id number]  MRN:  [id number]     CONSULTATION DATE:  2023    PRIMARY CARE PROVIDER:  Pcp Pt States None      REASON FOR CONSULT:  Abdominal pain and increased LFT's    HISTORY OF PRESENT ILLNESS:  Luis Manuel Hernandez is a 42 y.o. male  male who presents  with right lower chest/right upper quadrant abdominal pain that started last night and has been getting progressively worse.  He states it radiates into his back and his shoulder.  Nothing makes it better or worse.  He denies any fevers or chills nausea vomiting or productive cough.  He has no leg swelling.  He does not take any blood thinning medications. He had an US of RUQ that showed   1. Gallbladder sludge. No gallbladder wall thickening or pericholecystic fluid.  2. Mildly dilated main portal vein    He admits to drinking heavily but quit last year. He has not have fever, chills or night sweats. He has not had weight loss. No constipation or diarrhea.     PAST MEDICAL HISTORY:  Past Medical History:   Diagnosis Date    Brain aneurysm     Memory loss     Seizure (HCC)     Stroke (HCC)        PAST SURGICAL HISTORY:  Past Surgical History:   Procedure Laterality Date    CRANIOTOMY ANEURYSM Left     age 23    FACIAL FRACTURE ORIF          CURRENT MEDS:  Current Facility-Administered Medications   Medication Dose Route Frequency Provider Last Rate Last Admin    cefTRIAXone (Rocephin) injection 2,000 mg  2,000 mg Intravenous Once Alla Stafford M.D.        metroNIDAZOLE (Flagyl) IVPB 500 mg  500 mg Intravenous Q6HRS Alla Stafford M.D.         No current outpatient medications on file.        ALLERGIES:  No Known Allergies    SOCIAL HISTORY:  Social History     Socioeconomic History    Marital status: Single     Spouse name: Not on file    Number of children: Not on file    Years of education: Not on file    Highest education level: Not on file   Occupational History    Not on file    Tobacco Use    Smoking status: Every Day     Packs/day: 0.50     Years: 20.00     Pack years: 10.00     Types: Cigarettes    Smokeless tobacco: Never   Vaping Use    Vaping Use: Not on file   Substance and Sexual Activity    Alcohol use: Not Currently     Alcohol/week: 0.6 oz     Types: 1 Glasses of wine per week     Comment: x1 year    Drug use: Yes     Types: Marijuana     Comment: speed, meth, and cocaine last used 15 years ago, never IV    Sexual activity: Not Currently     Partners: Female     Birth control/protection: Condom   Other Topics Concern    Not on file   Social History Narrative    Not on file     Social Determinants of Health     Financial Resource Strain: Not on file   Food Insecurity: Not on file   Transportation Needs: Not on file   Physical Activity: Not on file   Stress: Not on file   Social Connections: Not on file   Intimate Partner Violence: Not on file   Housing Stability: Not on file       FAMILY HISTORY:  Family History   Problem Relation Age of Onset    Heart Disease Maternal Grandmother     Heart Disease Maternal Grandfather     Cancer Maternal Grandfather 68        pancreatic        REVIEW OF SYSTEMS:  General ROS: Negative for - chills, fever, night sweats or weight loss.  HEENT ROS: Negative  Respiratory ROS: Negative for - cough or shortness of breath.  Cardiovascular ROS:  Negative for - chest pain or palpitations.  Gastrointestinal ROS: As per the history of present illness.  Genito-Urinary ROS: Negative  Musculoskeletal ROS: Negative.  Neurological ROS: Negative  Skin ROS: negative  Hematology ROS: negative  Endocrinology ROS: Negative        PHYSICAL EXAM:  VITALS: /84   Pulse 82   Temp 36.7 °C (98 °F) (Temporal)   Resp 18   Wt 56 kg (123 lb 7.3 oz)   SpO2 100%   BMI 17.22 kg/m²   GEN:  Luis Manuel Hernandez is a 42 y.o. male in no acute distress. thin  HEENT: Mucous membranes pink and moist.  Sclera anicteric.    NECK:    Neck supple without lymphadenopathy or  thyromegaly.  LUNGS: Clear to auscultation posteriorly.  HEART: Regular rate and rhythm. S1 and S2 normal. No murmurs, gallops  ABD:  Tenderness in RUQ  RECTAL: Not done at this time.  EXT:  Without cyanosis, deformity or pitting edema.  SKIN:  Pink, warm, dry.  NEURO: Grossly intact, A/OR.    LABS:  Recent Labs     07/25/23  1815   WBC 8.9   MCV 88.3     Recent Labs     07/25/23  1815   GLUCOSE 134*   BUN 8   CO2 23     Lab Results   Component Value Date    INR 0.97 01/18/2017     No components found for: ALT, AST, GGT, ALKPHOS  No results found for: BILINEO      @[facility]([id number])@     @[employer]([id number])@       IMPRESSION/PLAN:  Abdominal pain  Elevated LFT's  Gallbladder sludge  PV dilated - may have component of liver disease    At this time not a lot of evidence of CBD obstruction. He likely has cholecystitis. CBD normal. Because it is not straight forward, we will see if we can get MRCP before decision on ERCP.      Jessica Calloway M.D.  Gastroenterology

## 2023-07-26 NOTE — H&P
Hospital Medicine History & Physical Note    Date of Service  7/25/2023    Primary Care Physician  Pcp Pt States None    Consultants  Dr peraza gi    Code Status  No Order    Chief Complaint  Chief Complaint   Patient presents with    Rib Pain     Patient c/o right rib and chest wall pain x 1 day.        History of Presenting Illness  Luis Manuel Hernandez is a 42 y.o. male who presented 7/25/2023 with past m medical of tobacco dependence, prior alcohol abuse, presents with complaints of right lower chest pain /right upper quadrant abdominal pain of 1 day duration.  Sharp pain, lasting for several hours, 8 out of 10 prompting him to come to the emergency department at [facility] for evaluation.  He was given one-time dose of IV morphine for his pain and laboratory obtained showed increased LFTs.  Patient referred to me for further care and management.    At the time of my exam, patient's pain had resolved      Dr peraza of gastroenterology was consulted      I discussed the plan of care with patient.    Review of Systems  Review of Systems   Constitutional: Negative.    HENT: Negative.     Eyes: Negative.    Respiratory: Negative.     Cardiovascular:  Positive for chest pain.   Gastrointestinal:  Positive for abdominal pain.   Genitourinary: Negative.    Musculoskeletal: Negative.    Skin: Negative.    Neurological: Negative.    Psychiatric/Behavioral: Negative.         Past Medical History   has a past medical history of Brain aneurysm, Memory loss, Seizure (HCC), and Stroke (HCC).    Surgical History   has a past surgical history that includes craniotomy aneurysm (Left) and facial fracture orif.     Family History  family history includes Cancer (age of onset: 68) in his maternal grandfather; Heart Disease in his maternal grandfather and maternal grandmother.   Family history reviewed with patient. There is no family history that is pertinent to the chief complaint.     Social History   reports that he  has been smoking cigarettes. He has a 10.00 pack-year smoking history. He has never used smokeless tobacco. He reports that he does not currently use alcohol after a past usage of about 0.6 oz of alcohol per week. He reports current drug use. Drug: Marijuana.    Allergies  No Known Allergies    Medications  None       Physical Exam  Temp:  [36.7 °C (98 °F)] 36.7 °C (98 °F)  Pulse:  [55-94] 82  Resp:  [18-19] 18  BP: (111-124)/(71-84) 111/84  SpO2:  [99 %-100 %] 100 %  Blood Pressure: 111/84   Temperature: 36.7 °C (98 °F)   Pulse: 82   Respiration: 18   Pulse Oximetry: 100 %       Physical Exam  Constitutional:       General: He is not in acute distress.     Appearance: He is ill-appearing. He is not toxic-appearing.      Comments: thin   HENT:      Nose: Nose normal.      Mouth/Throat:      Mouth: Mucous membranes are moist.   Eyes:      Extraocular Movements: Extraocular movements intact.      Pupils: Pupils are equal, round, and reactive to light.   Cardiovascular:      Rate and Rhythm: Normal rate and regular rhythm.      Heart sounds: No murmur heard.     No gallop.   Pulmonary:      Effort: Pulmonary effort is normal. No respiratory distress.      Breath sounds: No stridor. No wheezing or rhonchi.   Abdominal:      General: Abdomen is flat. There is no distension.      Palpations: There is no mass.      Tenderness: There is no abdominal tenderness. There is no guarding or rebound.      Hernia: No hernia is present.   Musculoskeletal:         General: No swelling, tenderness, deformity or signs of injury. Normal range of motion.      Cervical back: Normal range of motion.      Right lower leg: No edema.   Skin:     General: Skin is warm.      Capillary Refill: Capillary refill takes 2 to 3 seconds.      Coloration: Skin is not jaundiced or pale.      Findings: No bruising, erythema, lesion or rash.   Neurological:      General: No focal deficit present.      Cranial Nerves: No cranial nerve deficit.      Motor:  No weakness.   Psychiatric:      Comments: anxious         Laboratory:  Recent Labs     07/25/23 1815   WBC 8.9   RBC 5.31   HEMOGLOBIN 16.0   HEMATOCRIT 46.9   MCV 88.3   MCH 30.1   MCHC 34.1   RDW 42.5   PLATELETCT 292   MPV 9.7     Recent Labs     07/25/23 1815   SODIUM 137   POTASSIUM 4.1   CHLORIDE 100   CO2 23   GLUCOSE 134*   BUN 8   CREATININE 0.67   CALCIUM 9.7     Recent Labs     07/25/23 1815   ALTSGPT 296*   ASTSGOT 283*   ALKPHOSPHAT 171*   TBILIRUBIN 2.8*   GLUCOSE 134*         No results for input(s): NTPROBNP in the last 72 hours.      Recent Labs     07/25/23 1815   TROPONINT 7       Imaging:  US-RUQ   Final Result      1. Gallbladder sludge. No gallbladder wall thickening or pericholecystic fluid.   2. Mildly dilated main portal vein.         DX-CHEST-PORTABLE (1 VIEW)   Final Result         1. No acute cardiopulmonary abnormalities are identified.      [id number]    (Results Pending)       X-Ray:  I have personally reviewed the images and compared with prior images.    Assessment/Plan:  Justification for Admission Status  I anticipate this patient is appropriate for observation status at this time because I expect patient require less than 48 hours for evaluation treatment of his abdominal pain and increased liver functions test        * Cholelithiasis without cholangitis- (present on admission)  Assessment & Plan  At this time I do not believe antibiotics are currently warranted-we will hold off    Continue to monitor for fever or increased white cell count    Monitor liver function test trend    MRCP has been ordered to further evaluate the biliary tree for bile duct stones    Dr. Calloway GI to consult    Anxiety- (present on admission)  Assessment & Plan  Patient will receive  Ativan prior to his MRI    Tobacco dependence- (present on admission)  Assessment & Plan  Smoking cessation advised    Patient refused nicotine patch    Spent 3 minutes on smoking cessation education  [id number] (smoking  and tobacco cessation counseling visit; 3-10 min)          VTE prophylaxis: SCDs/TEDs

## 2023-07-26 NOTE — ED NOTES
Telephone handoff report given to Yamilex FIGUEROA. Pt resting comfortably in bed, VSS, NADN. Awaiting transports.

## 2023-07-26 NOTE — ED PROVIDER NOTES
ED Provider Note    CHIEF COMPLAINT  Chief Complaint   Patient presents with    Rib Pain     Patient c/o right rib and chest wall pain x 1 day.        EXTERNAL RECORDS REVIEWED  Outpatient Notes patient's last documented outpatient office visit in our system was 2/1/2018 where the patient was seen for follow-up on chest pain palpitations and fatigue and review of stress test results Labs were ordered at the time and no evidence of cardiac ischemia    HPI/ROS  LIMITATION TO HISTORY   Select: : None  OUTSIDE HISTORIAN(S):  none    Luis Manuel Hernandez is a 42 y.o. male who presents  with right lower chest/right upper quadrant abdominal pain that started last night and has been getting progressively worse.  He states it radiates into his back and his shoulder.  Nothing makes it better or worse.  He denies any fevers or chills nausea vomiting or productive cough.  He has no leg swelling.  He does not take any blood thinning medications.  He has a history of a brain aneurysm with a stroke in his 20s but completely recovered.  He is a smoker but he states he is trying to quit.    PAST MEDICAL HISTORY   has a past medical history of Brain aneurysm, Memory loss, Seizure (HCC), and Stroke (HCC).    SURGICAL HISTORY   has a past surgical history that includes craniotomy aneurysm (Left) and facial fracture orif.    FAMILY HISTORY  Family History   Problem Relation Age of Onset    Heart Disease Maternal Grandmother     Heart Disease Maternal Grandfather     Cancer Maternal Grandfather 68        pancreatic       SOCIAL HISTORY  Social History     Tobacco Use    Smoking status: Every Day     Packs/day: 0.50     Years: 20.00     Pack years: 10.00     Types: Cigarettes    Smokeless tobacco: Never   Vaping Use    Vaping Use: Not on file   Substance and Sexual Activity    Alcohol use: Not Currently     Alcohol/week: 0.6 oz     Types: 1 Glasses of wine per week     Comment: x1 year    Drug use: Yes     Types: Marijuana     Comment: speed,  meth, and cocaine last used 15 years ago, never IV    Sexual activity: Not Currently     Partners: Female     Birth control/protection: Condom       CURRENT MEDICATIONS  Home Medications       Reviewed by Susan Luna R.N. (Registered Nurse) on 07/25/23 at 1700  Med List Status: Complete     Medication Last Dose Status        Patient Jaden Taking any Medications                           ALLERGIES  No Known Allergies    PHYSICAL EXAM  VITAL SIGNS: /84   Pulse 62   Temp 36.7 °C (98 °F) (Temporal)   Resp 18   Wt 56 kg (123 lb 7.3 oz)   SpO2 100%   BMI 17.22 kg/m²      Constitutional: Well developed, Well nourished, uncomfortable, Non-toxic appearance.   HEENT: Normocephalic, Atraumatic,  external ears normal, pharynx pink,  Mucous  Membranes moist, No rhinorrhea or mucosal edema  Eyes: PERRL, EOMI, Conjunctiva normal, No discharge.   Neck: Normal range of motion, No tenderness, Supple, No stridor.   Lymphatic: No lymphadenopathy    Cardiovascular: Regular Rate and Rhythm, No murmurs,  rubs, or gallops.   Thorax & Lungs: Lungs clear to auscultation bilaterally, No respiratory distress, No wheezes, rhales or rhonchi, positive right lower chest wall tenderness.   Abdomen: Bowel sounds normal, Soft, right upper quadrant tenderness to palpation, non distended,  No pulsatile masses., no rebound guarding or peritoneal signs.   Skin: Warm, Dry, No erythema, No rash,   Back:  No CVA tenderness,  No spinal tenderness, bony crepitance step offs or instability.   Extremities: Equal, intact distal pulses, No cyanosis, clubbing or edema,  No tenderness.   Musculoskeletal: Good range of motion in all major joints. No tenderness to palpation or major deformities noted.   Neurologic: Alert & oriented x 3,  No focal deficits noted.   Psychiatric: Affect normal, Judgment normal, Mood normal.    DIAGNOSTIC STUDIES / PROCEDURES  EKG  I have independently interpreted this EKG  See below    LABS  Results for orders placed  or performed during the hospital encounter of 07/25/23   CBC with Differential   Result Value Ref Range    WBC 8.9 4.8 - 10.8 K/uL    RBC 5.31 4.70 - 6.10 M/uL    Hemoglobin 16.0 14.0 - 18.0 g/dL    Hematocrit 46.9 42.0 - 52.0 %    MCV 88.3 81.4 - 97.8 fL    MCH 30.1 27.0 - 33.0 pg    MCHC 34.1 32.3 - 36.5 g/dL    RDW 42.5 35.9 - 50.0 fL    Platelet Count 292 164 - 446 K/uL    MPV 9.7 9.0 - 12.9 fL    Neutrophils-Polys 87.60 (H) 44.00 - 72.00 %    Lymphocytes 6.40 (L) 22.00 - 41.00 %    Monocytes 4.90 0.00 - 13.40 %    Eosinophils 0.20 0.00 - 6.90 %    Basophils 0.60 0.00 - 1.80 %    Immature Granulocytes 0.30 0.00 - 0.90 %    Nucleated RBC 0.00 0.00 - 0.20 /100 WBC    Neutrophils (Absolute) 7.81 (H) 1.82 - 7.42 K/uL    Lymphs (Absolute) 0.57 (L) 1.00 - 4.80 K/uL    Monos (Absolute) 0.44 0.00 - 0.85 K/uL    Eos (Absolute) 0.02 0.00 - 0.51 K/uL    Baso (Absolute) 0.05 0.00 - 0.12 K/uL    Immature Granulocytes (abs) 0.03 0.00 - 0.11 K/uL    NRBC (Absolute) 0.00 K/uL   Complete Metabolic Panel (CMP)   Result Value Ref Range    Sodium 137 135 - 145 mmol/L    Potassium 4.1 3.6 - 5.5 mmol/L    Chloride 100 96 - 112 mmol/L    Co2 23 20 - 33 mmol/L    Anion Gap 14.0 7.0 - 16.0    Glucose 134 (H) 65 - 99 mg/dL    Bun 8 8 - 22 mg/dL    Creatinine 0.67 0.50 - 1.40 mg/dL    Calcium 9.7 8.5 - 10.5 mg/dL    Correct Calcium 9.0 8.5 - 10.5 mg/dL    AST(SGOT) 283 (H) 12 - 45 U/L    ALT(SGPT) 296 (H) 2 - 50 U/L    Alkaline Phosphatase 171 (H) 30 - 99 U/L    Total Bilirubin 2.8 (H) 0.1 - 1.5 mg/dL    Albumin 4.9 3.2 - 4.9 g/dL    Total Protein 7.3 6.0 - 8.2 g/dL    Globulin 2.4 1.9 - 3.5 g/dL    A-G Ratio 2.0 g/dL   Troponin STAT   Result Value Ref Range    Troponin T 7 6 - 19 ng/L   D-DIMER   Result Value Ref Range    D-Dimer 0.37 0.00 - 0.50 ug/mL (FEU)   ESTIMATED GFR   Result Value Ref Range    GFR (CKD-EPI) 118 >60 mL/min/1.73 m 2   EKG   Result Value Ref Range    Report       [facility]  Dept.    Test Date:  2023  Pt Name:    WENDY BEATTY                Department: ER  MRN:        [id number]                      Room:       AZ 73  Gender:     Male                         Technician:  :        1980                   Requested By:LEMUEL TORREZ  Order #:    [id number]                    Reading MD: LEMUEL TORREZ MD    Measurements  Intervals                                Axis  Rate:       53                           P:          74  AZ:         132                          QRS:        13  QRSD:       94                           T:          78  QT:         446  QTc:        419    Interpretive Statements  Sinus bradycardia  Abnormal T, consider ischemia, lateral leads  Compared to ECG 10/22/2019 21:21:50  T-wave abnormality now present  Possible ischemia now present  Sinus rhythm no longer present  Electronically Signed On 2023 20:11:16 PDT by LEMUEL TORREZ MD           RADIOLOGY  I have independently interpreted the diagnostic imaging associated with this visit and am waiting the final reading from the radiologist.   My preliminary interpretation is as follows: cxr no infiltrate or pleural effusion  Radiologist interpretation:   US-RUQ   Final Result      1. Gallbladder sludge. No gallbladder wall thickening or pericholecystic fluid.   2. Mildly dilated main portal vein.         DX-CHEST-PORTABLE (1 VIEW)   Final Result         1. No acute cardiopulmonary abnormalities are identified.      [id number]    (Results Pending)        COURSE & MEDICAL DECISION MAKING    ED Observation Status? Yes; I am placing the patient in to an observation status due to a diagnostic uncertainty as well as therapeutic intensity. Patient placed in observation status at 5:44 PM, 2023.     Observation plan is as follows: Lab work chest x-ray ultrasound and pain medication    Upon Reevaluation, the patient's condition has: not improved; and will be escalated to hospitalization.    Patient discharged  from ED Observation status at 7:55 PM  (Time) 7/25/23 (Date).     INITIAL ASSESSMENT, COURSE AND PLAN  Care Narrative: This is a 42-year-old male who comes in with right lower chest/right upper quadrant abdominal pain that started last night and has been getting progressively worse.  He states it radiates into his back and his shoulder.  Nothing makes it better or worse.  He denies any fevers or chills nausea vomiting or productive cough.  He has no leg swelling.  He does not take any blood thinning medications.  He has a history of a brain aneurysm with a stroke in his 20s but completely recovered.  He is a smoker but he states he is trying to quit.  On physical exam he has right upper quadrant and right lower chest pain without any bony step-offs or crepitance.  His lungs are clear and his heart is regular rhythm.  Of ordered a chest pain work-up as well as a gallbladder ultrasound and given pain medication to further evaluate his symptoms.     Differential diagnosis: Pneumonia, pleurisy, pleural effusion, pulmonary embolus, cholelithiasis, cholecystitis, hepatitis  ADDITIONAL PROBLEM LIST  Brain aneurysm  Seizure  Stroke  DISPOSITION AND DISCUSSIONS    IV was started and labs were drawn.  The patient was given morphine and Zofran for pain and nausea.    Patient's white blood cell count is normal with 87% neutrophils.  Hemoglobin hematocrit are normal platelet count is normal.  His troponin is normal at 7.  Comprehensive metabolic panel shows elevated liver function tests with an AST of 283  alk phos 171 and a total bilirubin elevated at 2.8.  His glucose is elevated at 134 his electrolytes and kidney function are normal.  His EKG shows none specific changes.  With his low risk factors and a negative troponin this gives him a heart score of 1.    Patient's chest x-ray does not show any infiltrates or pleural effusions his gallbladder ultrasound does show sludge but no common bile duct dilatation or  gallbladder wall thickening.  I gave the patient a dose of IV Rocephin and Flagyl and he will be admitted to the hospital for consult to GI due to his obstructive cholecystitis with elevated bilirubin and elevated LFTs.  Surgery will be consulted as well for possible cholecystectomy      I explained to the patient that his liver enzymes were grossly abnormal and he had gallbladder sludge and some increased bilirubin.  These were all consistent with infection and inflammation of the gallbladder.  I had spoken with GI and Dr. Calloway who  recommended an MRCP and admission to the hospital.            I have discussed management of the patient with the following physicians and CHRISTOPHE's:   GI Dr. Calloway who wants an MRCP and serial labs and will consult on the case  Hospitalist Dr. Magana who will admit for pain control and further care  Surgery Dr Whitt who consult on the patient's case     Discussion of management with other QHP or appropriate source(s): None     Escalation of care considered, and ultimately not performed: none    Barriers to care at this time, including but not limited to: Patient does not have established PCP.     Decision tools and prescription drugs considered including, but not limited to: Pain Medications morphine and Zofran and HEART Score 1 .    Patient is admitted in guarded condition    FINAL DIAGNOSIS  1. Cholecystitis without cholelithiasis    2. Biliary obstruction    3. Elevated LFTs           Electronically signed by: Alla Stafford M.D., 7/25/2023 5:42 PM

## 2023-07-26 NOTE — ED TRIAGE NOTES
.Luis Manuel Hernandez.  Chief Complaint   Patient presents with    Rib Pain     Patient c/o right rib and chest wall pain x 1 day.      Patient ambulatory to triage with above complaint. Patient denies any trauma to area.     Patient to lobby and instructed to inform staff of any needs.

## 2023-07-27 ENCOUNTER — ANESTHESIA EVENT (OUTPATIENT)
Dept: SURGERY | Facility: MEDICAL CENTER | Age: 43
End: 2023-07-27
Payer: MEDICAID

## 2023-07-27 ENCOUNTER — PHARMACY VISIT (OUTPATIENT)
Dept: PHARMACY | Facility: MEDICAL CENTER | Age: 43
End: 2023-07-27
Payer: COMMERCIAL

## 2023-07-27 ENCOUNTER — ANESTHESIA (OUTPATIENT)
Dept: SURGERY | Facility: MEDICAL CENTER | Age: 43
End: 2023-07-27
Payer: MEDICAID

## 2023-07-27 VITALS
TEMPERATURE: 96.7 F | BODY MASS INDEX: 16.6 KG/M2 | RESPIRATION RATE: 20 BRPM | HEART RATE: 90 BPM | WEIGHT: 118.61 LBS | HEIGHT: 71 IN | DIASTOLIC BLOOD PRESSURE: 82 MMHG | SYSTOLIC BLOOD PRESSURE: 141 MMHG | OXYGEN SATURATION: 98 %

## 2023-07-27 PROBLEM — K80.20 CHOLELITHIASIS WITHOUT CHOLANGITIS: Status: RESOLVED | Noted: 2023-07-25 | Resolved: 2023-07-27

## 2023-07-27 PROBLEM — Z78.9 NO CONTRAINDICATION TO DEEP VEIN THROMBOSIS (DVT) PROPHYLAXIS: Status: RESOLVED | Noted: 2023-07-26 | Resolved: 2023-07-27

## 2023-07-27 LAB
ALBUMIN SERPL BCP-MCNC: 3.6 G/DL (ref 3.2–4.9)
ALBUMIN/GLOB SERPL: 1.9 G/DL
ALP SERPL-CCNC: 160 U/L (ref 30–99)
ALT SERPL-CCNC: 244 U/L (ref 2–50)
ANION GAP SERPL CALC-SCNC: 7 MMOL/L (ref 7–16)
AST SERPL-CCNC: 107 U/L (ref 12–45)
BASOPHILS # BLD AUTO: 1 % (ref 0–1.8)
BASOPHILS # BLD: 0.05 K/UL (ref 0–0.12)
BILIRUB SERPL-MCNC: 2.5 MG/DL (ref 0.1–1.5)
BUN SERPL-MCNC: 7 MG/DL (ref 8–22)
CALCIUM ALBUM COR SERPL-MCNC: 8.7 MG/DL (ref 8.5–10.5)
CALCIUM SERPL-MCNC: 8.4 MG/DL (ref 8.5–10.5)
CHLORIDE SERPL-SCNC: 107 MMOL/L (ref 96–112)
CO2 SERPL-SCNC: 27 MMOL/L (ref 20–33)
CREAT SERPL-MCNC: 0.72 MG/DL (ref 0.5–1.4)
EOSINOPHIL # BLD AUTO: 0.13 K/UL (ref 0–0.51)
EOSINOPHIL NFR BLD: 2.7 % (ref 0–6.9)
ERYTHROCYTE [DISTWIDTH] IN BLOOD BY AUTOMATED COUNT: 44.4 FL (ref 35.9–50)
GFR SERPLBLD CREATININE-BSD FMLA CKD-EPI: 115 ML/MIN/1.73 M 2
GLOBULIN SER CALC-MCNC: 1.9 G/DL (ref 1.9–3.5)
GLUCOSE SERPL-MCNC: 92 MG/DL (ref 65–99)
HCT VFR BLD AUTO: 41.4 % (ref 42–52)
HGB BLD-MCNC: 13.8 G/DL (ref 14–18)
IMM GRANULOCYTES # BLD AUTO: 0.02 K/UL (ref 0–0.11)
IMM GRANULOCYTES NFR BLD AUTO: 0.4 % (ref 0–0.9)
LYMPHOCYTES # BLD AUTO: 1.07 K/UL (ref 1–4.8)
LYMPHOCYTES NFR BLD: 22.1 % (ref 22–41)
MCH RBC QN AUTO: 29.9 PG (ref 27–33)
MCHC RBC AUTO-ENTMCNC: 33.3 G/DL (ref 32.3–36.5)
MCV RBC AUTO: 89.8 FL (ref 81.4–97.8)
MONOCYTES # BLD AUTO: 0.46 K/UL (ref 0–0.85)
MONOCYTES NFR BLD AUTO: 9.5 % (ref 0–13.4)
NEUTROPHILS # BLD AUTO: 3.11 K/UL (ref 1.82–7.42)
NEUTROPHILS NFR BLD: 64.3 % (ref 44–72)
NRBC # BLD AUTO: 0 K/UL
NRBC BLD-RTO: 0 /100 WBC (ref 0–0.2)
PATHOLOGY CONSULT NOTE: NORMAL
PLATELET # BLD AUTO: 244 K/UL (ref 164–446)
PMV BLD AUTO: 9.8 FL (ref 9–12.9)
POTASSIUM SERPL-SCNC: 4.7 MMOL/L (ref 3.6–5.5)
PROT SERPL-MCNC: 5.5 G/DL (ref 6–8.2)
RBC # BLD AUTO: 4.61 M/UL (ref 4.7–6.1)
SODIUM SERPL-SCNC: 141 MMOL/L (ref 135–145)
WBC # BLD AUTO: 4.8 K/UL (ref 4.8–10.8)

## 2023-07-27 PROCEDURE — 160002 HCHG RECOVERY MINUTES (STAT): Performed by: SURGERY

## 2023-07-27 PROCEDURE — 700111 HCHG RX REV CODE 636 W/ 250 OVERRIDE (IP): Mod: JZ,UD | Performed by: HOSPITALIST

## 2023-07-27 PROCEDURE — 96376 TX/PRO/DX INJ SAME DRUG ADON: CPT

## 2023-07-27 PROCEDURE — 00790 ANES IPER UPR ABD NOS: CPT | Performed by: ANESTHESIOLOGY

## 2023-07-27 PROCEDURE — 47562 LAPAROSCOPIC CHOLECYSTECTOMY: CPT | Mod: AS | Performed by: NURSE PRACTITIONER

## 2023-07-27 PROCEDURE — 99239 HOSP IP/OBS DSCHRG MGMT >30: CPT

## 2023-07-27 PROCEDURE — 47562 LAPAROSCOPIC CHOLECYSTECTOMY: CPT | Performed by: SURGERY

## 2023-07-27 PROCEDURE — G0378 HOSPITAL OBSERVATION PER HR: HCPCS

## 2023-07-27 PROCEDURE — 700105 HCHG RX REV CODE 258: Mod: UD | Performed by: HOSPITALIST

## 2023-07-27 PROCEDURE — 88304 TISSUE EXAM BY PATHOLOGIST: CPT

## 2023-07-27 PROCEDURE — A9270 NON-COVERED ITEM OR SERVICE: HCPCS | Mod: UD | Performed by: ANESTHESIOLOGY

## 2023-07-27 PROCEDURE — 99232 SBSQ HOSP IP/OBS MODERATE 35: CPT | Mod: 57 | Performed by: NURSE PRACTITIONER

## 2023-07-27 PROCEDURE — 160009 HCHG ANES TIME/MIN: Performed by: SURGERY

## 2023-07-27 PROCEDURE — 160048 HCHG OR STATISTICAL LEVEL 1-5: Performed by: SURGERY

## 2023-07-27 PROCEDURE — RXMED WILLOW AMBULATORY MEDICATION CHARGE

## 2023-07-27 PROCEDURE — 99239 HOSP IP/OBS DSCHRG MGMT >30: CPT | Mod: FS | Performed by: INTERNAL MEDICINE

## 2023-07-27 PROCEDURE — 700101 HCHG RX REV CODE 250: Mod: UD | Performed by: SURGERY

## 2023-07-27 PROCEDURE — 160035 HCHG PACU - 1ST 60 MINS PHASE I: Performed by: SURGERY

## 2023-07-27 PROCEDURE — 85025 COMPLETE CBC W/AUTO DIFF WBC: CPT

## 2023-07-27 PROCEDURE — 700111 HCHG RX REV CODE 636 W/ 250 OVERRIDE (IP): Mod: UD | Performed by: ANESTHESIOLOGY

## 2023-07-27 PROCEDURE — 80053 COMPREHEN METABOLIC PANEL: CPT

## 2023-07-27 PROCEDURE — 700101 HCHG RX REV CODE 250: Mod: UD | Performed by: ANESTHESIOLOGY

## 2023-07-27 PROCEDURE — 160029 HCHG SURGERY MINUTES - 1ST 30 MINS LEVEL 4: Performed by: SURGERY

## 2023-07-27 PROCEDURE — 160041 HCHG SURGERY MINUTES - EA ADDL 1 MIN LEVEL 4: Performed by: SURGERY

## 2023-07-27 PROCEDURE — 700102 HCHG RX REV CODE 250 W/ 637 OVERRIDE(OP): Mod: UD | Performed by: ANESTHESIOLOGY

## 2023-07-27 RX ORDER — LIDOCAINE HYDROCHLORIDE 40 MG/ML
SOLUTION TOPICAL PRN
Status: DISCONTINUED | OUTPATIENT
Start: 2023-07-27 | End: 2023-07-27 | Stop reason: SURG

## 2023-07-27 RX ORDER — CELECOXIB 200 MG/1
400 CAPSULE ORAL ONCE
Status: COMPLETED | OUTPATIENT
Start: 2023-07-27 | End: 2023-07-27

## 2023-07-27 RX ORDER — OXYCODONE HCL 5 MG/5 ML
10 SOLUTION, ORAL ORAL
Status: COMPLETED | OUTPATIENT
Start: 2023-07-27 | End: 2023-07-27

## 2023-07-27 RX ORDER — GLYCOPYRROLATE 0.2 MG/ML
INJECTION INTRAMUSCULAR; INTRAVENOUS PRN
Status: DISCONTINUED | OUTPATIENT
Start: 2023-07-27 | End: 2023-07-27 | Stop reason: SURG

## 2023-07-27 RX ORDER — HYDROMORPHONE HYDROCHLORIDE 1 MG/ML
0.1 INJECTION, SOLUTION INTRAMUSCULAR; INTRAVENOUS; SUBCUTANEOUS
Status: DISCONTINUED | OUTPATIENT
Start: 2023-07-27 | End: 2023-07-27 | Stop reason: HOSPADM

## 2023-07-27 RX ORDER — HALOPERIDOL 5 MG/ML
1 INJECTION INTRAMUSCULAR
Status: DISCONTINUED | OUTPATIENT
Start: 2023-07-27 | End: 2023-07-27 | Stop reason: HOSPADM

## 2023-07-27 RX ORDER — BUPIVACAINE HYDROCHLORIDE AND EPINEPHRINE 5; 5 MG/ML; UG/ML
INJECTION, SOLUTION EPIDURAL; INTRACAUDAL; PERINEURAL
Status: DISCONTINUED | OUTPATIENT
Start: 2023-07-27 | End: 2023-07-27 | Stop reason: HOSPADM

## 2023-07-27 RX ORDER — ACETAMINOPHEN 500 MG
1000 TABLET ORAL ONCE
Status: COMPLETED | OUTPATIENT
Start: 2023-07-27 | End: 2023-07-27

## 2023-07-27 RX ORDER — DEXAMETHASONE SODIUM PHOSPHATE 4 MG/ML
INJECTION, SOLUTION INTRA-ARTICULAR; INTRALESIONAL; INTRAMUSCULAR; INTRAVENOUS; SOFT TISSUE PRN
Status: DISCONTINUED | OUTPATIENT
Start: 2023-07-27 | End: 2023-07-27 | Stop reason: SURG

## 2023-07-27 RX ORDER — MEPERIDINE HYDROCHLORIDE 25 MG/ML
12.5 INJECTION INTRAMUSCULAR; INTRAVENOUS; SUBCUTANEOUS
Status: DISCONTINUED | OUTPATIENT
Start: 2023-07-27 | End: 2023-07-27 | Stop reason: HOSPADM

## 2023-07-27 RX ORDER — NEOSTIGMINE METHYLSULFATE 1 MG/ML
INJECTION, SOLUTION INTRAVENOUS PRN
Status: DISCONTINUED | OUTPATIENT
Start: 2023-07-27 | End: 2023-07-27 | Stop reason: SURG

## 2023-07-27 RX ORDER — ONDANSETRON 2 MG/ML
4 INJECTION INTRAMUSCULAR; INTRAVENOUS
Status: DISCONTINUED | OUTPATIENT
Start: 2023-07-27 | End: 2023-07-27 | Stop reason: HOSPADM

## 2023-07-27 RX ORDER — SODIUM CHLORIDE, SODIUM LACTATE, POTASSIUM CHLORIDE, CALCIUM CHLORIDE 600; 310; 30; 20 MG/100ML; MG/100ML; MG/100ML; MG/100ML
INJECTION, SOLUTION INTRAVENOUS CONTINUOUS
Status: DISCONTINUED | OUTPATIENT
Start: 2023-07-27 | End: 2023-07-27 | Stop reason: HOSPADM

## 2023-07-27 RX ORDER — ONDANSETRON 2 MG/ML
INJECTION INTRAMUSCULAR; INTRAVENOUS PRN
Status: DISCONTINUED | OUTPATIENT
Start: 2023-07-27 | End: 2023-07-27 | Stop reason: SURG

## 2023-07-27 RX ORDER — LIDOCAINE HYDROCHLORIDE 20 MG/ML
INJECTION, SOLUTION EPIDURAL; INFILTRATION; INTRACAUDAL; PERINEURAL PRN
Status: DISCONTINUED | OUTPATIENT
Start: 2023-07-27 | End: 2023-07-27 | Stop reason: SURG

## 2023-07-27 RX ORDER — OXYCODONE HCL 5 MG/5 ML
5 SOLUTION, ORAL ORAL
Status: COMPLETED | OUTPATIENT
Start: 2023-07-27 | End: 2023-07-27

## 2023-07-27 RX ORDER — CEFOTETAN DISODIUM 2 G/20ML
INJECTION, POWDER, FOR SOLUTION INTRAMUSCULAR; INTRAVENOUS PRN
Status: DISCONTINUED | OUTPATIENT
Start: 2023-07-27 | End: 2023-07-27 | Stop reason: SURG

## 2023-07-27 RX ORDER — HYDROMORPHONE HYDROCHLORIDE 1 MG/ML
0.2 INJECTION, SOLUTION INTRAMUSCULAR; INTRAVENOUS; SUBCUTANEOUS
Status: DISCONTINUED | OUTPATIENT
Start: 2023-07-27 | End: 2023-07-27 | Stop reason: HOSPADM

## 2023-07-27 RX ORDER — HYDROCODONE BITARTRATE AND ACETAMINOPHEN 5; 325 MG/1; MG/1
1 TABLET ORAL EVERY 6 HOURS PRN
Qty: 10 TABLET | Refills: 0 | Status: SHIPPED | OUTPATIENT
Start: 2023-07-27 | End: 2023-07-30

## 2023-07-27 RX ORDER — HYDROMORPHONE HYDROCHLORIDE 1 MG/ML
0.4 INJECTION, SOLUTION INTRAMUSCULAR; INTRAVENOUS; SUBCUTANEOUS
Status: DISCONTINUED | OUTPATIENT
Start: 2023-07-27 | End: 2023-07-27 | Stop reason: HOSPADM

## 2023-07-27 RX ORDER — ACETAMINOPHEN 500 MG
500-1000 TABLET ORAL EVERY 6 HOURS PRN
Qty: 30 TABLET | Refills: 0 | Status: SHIPPED | COMMUNITY
Start: 2023-07-27 | End: 2023-07-27

## 2023-07-27 RX ADMIN — OXYCODONE HYDROCHLORIDE 10 MG: 5 SOLUTION ORAL at 11:40

## 2023-07-27 RX ADMIN — CEFOTETAN DISODIUM 2 G: 2 INJECTION, POWDER, FOR SOLUTION INTRAMUSCULAR; INTRAVENOUS at 10:18

## 2023-07-27 RX ADMIN — FENTANYL CITRATE 100 MCG: 50 INJECTION, SOLUTION INTRAMUSCULAR; INTRAVENOUS at 10:43

## 2023-07-27 RX ADMIN — NEOSTIGMINE METHYLSULFATE 2 MG: 1 INJECTION INTRAVENOUS at 11:01

## 2023-07-27 RX ADMIN — FENTANYL CITRATE 150 MCG: 50 INJECTION, SOLUTION INTRAMUSCULAR; INTRAVENOUS at 10:18

## 2023-07-27 RX ADMIN — CELECOXIB 400 MG: 200 CAPSULE ORAL at 09:20

## 2023-07-27 RX ADMIN — MIDAZOLAM 2 MG: 1 INJECTION, SOLUTION INTRAMUSCULAR; INTRAVENOUS at 10:13

## 2023-07-27 RX ADMIN — LIDOCAINE HYDROCHLORIDE 4 ML: 40 SOLUTION TOPICAL at 10:20

## 2023-07-27 RX ADMIN — SODIUM CHLORIDE: 9 INJECTION, SOLUTION INTRAVENOUS at 11:05

## 2023-07-27 RX ADMIN — ROCURONIUM BROMIDE 10 MG: 10 INJECTION, SOLUTION INTRAVENOUS at 10:18

## 2023-07-27 RX ADMIN — MORPHINE SULFATE 2 MG: 4 INJECTION, SOLUTION INTRAMUSCULAR; INTRAVENOUS at 04:57

## 2023-07-27 RX ADMIN — PROPOFOL 140 MG: 10 INJECTION, EMULSION INTRAVENOUS at 10:18

## 2023-07-27 RX ADMIN — ACETAMINOPHEN 1000 MG: 500 TABLET, FILM COATED ORAL at 09:19

## 2023-07-27 RX ADMIN — SODIUM CHLORIDE: 9 INJECTION, SOLUTION INTRAVENOUS at 10:13

## 2023-07-27 RX ADMIN — Medication 70 MG: at 10:18

## 2023-07-27 RX ADMIN — GLYCOPYRROLATE 0.2 MG: 0.2 INJECTION INTRAMUSCULAR; INTRAVENOUS at 11:01

## 2023-07-27 RX ADMIN — ONDANSETRON 4 MG: 2 INJECTION INTRAMUSCULAR; INTRAVENOUS at 10:54

## 2023-07-27 RX ADMIN — DEXAMETHASONE SODIUM PHOSPHATE 8 MG: 4 INJECTION INTRA-ARTICULAR; INTRALESIONAL; INTRAMUSCULAR; INTRAVENOUS; SOFT TISSUE at 10:23

## 2023-07-27 RX ADMIN — ROCURONIUM BROMIDE 20 MG: 10 INJECTION, SOLUTION INTRAVENOUS at 10:27

## 2023-07-27 RX ADMIN — LIDOCAINE HYDROCHLORIDE 70 MG: 20 INJECTION, SOLUTION EPIDURAL; INFILTRATION; INTRACAUDAL at 10:18

## 2023-07-27 RX ADMIN — SODIUM CHLORIDE: 9 INJECTION, SOLUTION INTRAVENOUS at 06:34

## 2023-07-27 ASSESSMENT — PAIN DESCRIPTION - PAIN TYPE
TYPE: SURGICAL PAIN
TYPE: SURGICAL PAIN
TYPE: ACUTE PAIN
TYPE: SURGICAL PAIN

## 2023-07-27 ASSESSMENT — ENCOUNTER SYMPTOMS
CHILLS: 0
FEVER: 0
SHORTNESS OF BREATH: 0
NAUSEA: 0
VOMITING: 0
ABDOMINAL PAIN: 0

## 2023-07-27 ASSESSMENT — PAIN SCALES - GENERAL: PAIN_LEVEL: 4

## 2023-07-27 NOTE — PROGRESS NOTES
Patient is being discharged home from the discharge lounge. Patient is A&Ox4. IV removed on unit. Discharge instructions provided to patient and reviewed. Patient verbalized understanding and all questions and concerns were addressed. Meds to beds delivered and taxi called for patient.

## 2023-07-27 NOTE — PROGRESS NOTES
This RN contacted YOSELIN Gupta regarding the intent to discharge. Patient ambulating without difficulty, voiding and tolerating a liquid diet at this time. The patient is ok from surgery's standpoint to discharge.

## 2023-07-27 NOTE — ANESTHESIA POSTPROCEDURE EVALUATION
Patient: Luis Manuel Hernandez    Procedure Summary     Date: 07/27/23 Room / Location: [facility] 09 / SURGERY [facility]    Anesthesia Start: 1013 Anesthesia Stop: 1119    Procedure: CHOLECYSTECTOMY, LAPAROSCOPIC (Abdomen) Diagnosis: (cholelithiasis)    Surgeons: Miguel Sullivan M.D. Responsible Provider: Maria Eugenia Quispe M.D.    Anesthesia Type: general ASA Status: 3          Final Anesthesia Type: general  Last vitals  BP   Blood Pressure: 127/78    Temp   35.9 °C (96.7 °F)    Pulse   (!) 51   Resp   20    SpO2   100 %      Anesthesia Post Evaluation    Patient location during evaluation: PACU  Patient participation: complete - patient participated  Level of consciousness: awake  Pain score: 4    Airway patency: patent  Anesthetic complications: no  Cardiovascular status: hemodynamically stable  Respiratory status: acceptable  Hydration status: acceptable    PONV: none          No notable events documented.     Nurse Pain Score: 4 (NPRS)

## 2023-07-27 NOTE — PROGRESS NOTES
Patient dressed and ready for discharge. Patient seen by MD and discharge lounge order entered. Patient stated they would call and pay for a taxi upon discharge.

## 2023-07-27 NOTE — PROGRESS NOTES
"Hospital Medicine Daily Progress Note    Date of Service  7/26/2023    Chief Complaint  Luis Manuel Hernandez is a 42 y.o. male admitted 7/25/2023 with RUQ abdominal pain    Hospital Course  Luis Manuel Hernandez is a 42 y.o. male who presented 7/25/2023 with past medical history of tobacco dependence, brain aneurysm with stroke, and prior alcohol abuse who presents with complaints of right lower chest pain /right upper quadrant abdominal pain of 1 day duration.  Sharp pain, lasting for several hours, 8 out of 10. No specific alleviating/aggravating factors.     In ED he was given morphine and zofran for pain and nausea. No leukocytosis, , , , Tbili 2.8. RUQ ultrasound with GB sludge but no duct dilatation or wall thickening. EDP consulted GI and general surgery to further evaluate cholecystitis. He was admitted to hospitalist service for observation and further management.     Interval Problem Update  7/26/2023: No fever/leukocytosis. Pain adequately controlled, very eager to \"eat regular food\". No evidence of biliary obstruction on MRCP, no need for ERCP per Dr. Calloway. Discussed with gen surg, he will go for lap chiqui in AM. NPO at midnight. OK for chemical DVT ppx per surgery.    I have discussed this patient's plan of care and discharge plan at IDT rounds today with Case Management, Nursing, Nursing leadership, and other members of the IDT team.    Consultants/Specialty  general surgery and GI    Code Status  Full Code    Disposition  The patient is not medically cleared for discharge to home or a post-acute facility.  Anticipate discharge to: home with close outpatient follow-up    I have placed the appropriate orders for post-discharge needs.    Review of Systems  Review of Systems   Constitutional:  Positive for malaise/fatigue. Negative for chills and fever.   HENT:  Negative for congestion and sore throat.    Respiratory:  Negative for cough and shortness of breath.    Cardiovascular:  Negative for " chest pain and leg swelling.   Gastrointestinal:  Positive for abdominal pain. Negative for diarrhea, nausea and vomiting.   Genitourinary:  Negative for dysuria.   Musculoskeletal:  Negative for myalgias.   Neurological:  Negative for weakness and headaches.        Physical Exam  Temp:  [36.2 °C (97.1 °F)-36.7 °C (98 °F)] 36.7 °C (98 °F)  Pulse:  [54-94] 66  Resp:  [16-19] 16  BP: ()/(61-84) 96/61  SpO2:  [97 %-100 %] 100 %    Physical Exam  Vitals and nursing note reviewed.   Constitutional:       General: He is not in acute distress.     Appearance: He is well-developed. He is not toxic-appearing.      Comments: thin   HENT:      Head: Normocephalic.      Mouth/Throat:      Mouth: Mucous membranes are moist.      Pharynx: Oropharynx is clear.   Eyes:      Conjunctiva/sclera: Conjunctivae normal.      Pupils: Pupils are equal, round, and reactive to light.   Cardiovascular:      Rate and Rhythm: Normal rate and regular rhythm.      Pulses: Normal pulses.      Heart sounds: Normal heart sounds.   Pulmonary:      Effort: Pulmonary effort is normal. No respiratory distress.      Breath sounds: Normal breath sounds.   Abdominal:      General: Abdomen is flat. Bowel sounds are normal. There is no distension.      Palpations: Abdomen is soft.      Tenderness: There is abdominal tenderness (RUQ). There is no guarding.   Musculoskeletal:         General: Normal range of motion.      Cervical back: Normal range of motion and neck supple.   Skin:     General: Skin is warm and dry.      Capillary Refill: Capillary refill takes less than 2 seconds.   Neurological:      General: No focal deficit present.      Mental Status: He is alert, oriented to person, place, and time and easily aroused. Mental status is at baseline.         Fluids    Intake/Output Summary (Last 24 hours) at 7/26/2023 1754  Last data filed at 7/25/2023 2200  Gross per 24 hour   Intake 103.75 ml   Output --   Net 103.75 ml       Laboratory  Recent  Labs     07/25/23  1815 07/26/23  0025   WBC 8.9 6.7   RBC 5.31 4.64*   HEMOGLOBIN 16.0 14.2   HEMATOCRIT 46.9 40.8*   MCV 88.3 87.9   MCH 30.1 30.6   MCHC 34.1 34.8   RDW 42.5 41.6   PLATELETCT 292 276   MPV 9.7 9.5     Recent Labs     07/25/23  1815 07/26/23  0025   SODIUM 137 138   POTASSIUM 4.1 4.1   CHLORIDE 100 102   CO2 23 26   GLUCOSE 134* 108*   BUN 8 9   CREATININE 0.67 0.58   CALCIUM 9.7 8.7     Imaging  [id number]   Final Result      1.  Nondistended gallbladder with debris and/or sludge present.  Surrounding edema or fluid concerning for acute cholecystitis.   2.  No biliary dilation or common bile duct stone.   3.  Stomach is distended with fluid.      US-RUQ   Final Result      1. Gallbladder sludge. No gallbladder wall thickening or pericholecystic fluid.   2. Mildly dilated main portal vein.         DX-CHEST-PORTABLE (1 VIEW)   Final Result         1. No acute cardiopulmonary abnormalities are identified.           Assessment/Plan  * Cholelithiasis without cholangitis- (present on admission)  Assessment & Plan  No fever or leukocytosis  MRCP - no evidence biliary dilation or bile duct stone - discussed with GI, no need for ERCP  Discussed with general surgery, plan is for lap chiqui in AM  Clear liquid diet  NPO at midnight  IVF while NPO  Follow LFTs    No contraindication to deep vein thrombosis (DVT) prophylaxis- (present on admission)  Assessment & Plan  Discussed with general surgery. OK for Lovenox, no need to hold for surgery.     Anxiety- (present on admission)  Assessment & Plan  Ativan x1 for MRI    Tobacco dependence- (present on admission)  Assessment & Plan  Smoking cessation advised  Patient refused nicotine patch         VTE prophylaxis:   SCDs/TEDs   enoxaparin ppx      I have performed a physical exam and reviewed and updated ROS and Plan today (7/26/2023). In review of yesterday's note (7/25/2023), there are no changes except as documented above.

## 2023-07-27 NOTE — HOSPITAL COURSE
Luis Manuel Hernandez is a 42 y.o. male who presented 7/25/2023 with past medical history of tobacco dependence, brain aneurysm with stroke, and prior alcohol abuse who presents with complaints of right lower chest pain /right upper quadrant abdominal pain of 1 day duration.  Sharp pain, lasting for several hours, 8 out of 10. No specific alleviating/aggravating factors.     In ED he was given morphine and zofran for pain and nausea. No leukocytosis, , , , Tbili 2.8. RUQ ultrasound with GB sludge but no duct dilatation or wall thickening. EDP consulted GI and general surgery to further evaluate cholecystitis. He was admitted to hospitalist service for observation and further management.     On 7/26, no fever/leukocytosis. Pain adequately controlled. No evidence of biliary obstruction on MRCP - discussed with GI Dr. Calloway, no need to proceed with ERCP. General surgery scheduled him for laparoscopic cholecystectomy in the morning.     On 7/27 he had lap chiqui with Dr. Sullivan. He tolerated procedure well and was subsequently advanced to a bland diet. He did have moderate incisional pain post-operatively that was treated with Norco 5mg. By afternoon he was ambulating the unit several times independently and tolerating a full diet.     Provided prescription for short course Norco 5mg. No post-operative antibiotics required by surgery team.  He was scheduled for follow up with Dr. Sullivan in 1 week for wound check, with CMP to be done the day before appointment. Referral also sent to establish with a primary care doctor.

## 2023-07-27 NOTE — PROGRESS NOTES
Patient pacing in the room ready for discharge. YOSELIN Gutierrez notified that the patient is ready to discharge.

## 2023-07-27 NOTE — PROGRESS NOTES
"  DATE: 7/27/2023    Hospital day 2 cholelithiasis without cholangitis.    INTERVAL EVENTS:  Doing ok, back stiffness is major complaint, denies N/V or abdominal pain. Eager to get home to cat.  LFTs improved, t bili stable.    REVIEW OF SYSTEMS:  Review of Systems   Constitutional:  Negative for chills, fever and malaise/fatigue.   Respiratory:  Negative for shortness of breath.    Cardiovascular:  Negative for chest pain.   Gastrointestinal:  Negative for abdominal pain (mild), nausea and vomiting.   Genitourinary:  Negative for dysuria (voiding).     PHYSICAL EXAMINATION:    Vital Signs: /63   Pulse 61   Temp 37 °C (98.6 °F) (Temporal)   Resp 16   Ht 1.803 m (5' 11\")   Wt 53.8 kg (118 lb 9.7 oz)   SpO2 98%   Physical Exam  Vitals and nursing note reviewed.   Constitutional:       General: He is awake. He is not in acute distress.     Appearance: He is well-developed.   Pulmonary:      Effort: Pulmonary effort is normal. No respiratory distress.   Abdominal:      General: There is no distension.      Palpations: Abdomen is soft.      Tenderness: There is no abdominal tenderness. There is no guarding.   Musculoskeletal:      Comments: Moves all extremities   Skin:     General: Skin is warm and dry.   Neurological:      Mental Status: He is alert. Mental status is at baseline.   Psychiatric:         Mood and Affect: Mood normal.         Behavior: Behavior normal. Behavior is cooperative.       LABORATORY VALUES:   Recent Labs     07/25/23 1815 07/26/23 0025 07/27/23  0025   WBC 8.9 6.7 4.8   RBC 5.31 4.64* 4.61*   HEMOGLOBIN 16.0 14.2 13.8*   HEMATOCRIT 46.9 40.8* 41.4*   MCV 88.3 87.9 89.8   MCH 30.1 30.6 29.9   MCHC 34.1 34.8 33.3   RDW 42.5 41.6 44.4   PLATELETCT 292 276 244   MPV 9.7 9.5 9.8     Recent Labs     07/25/23 1815 07/26/23 0025 07/27/23  0025   SODIUM 137 138 141   POTASSIUM 4.1 4.1 4.7   CHLORIDE 100 102 107   CO2 23 26 27   GLUCOSE 134* 108* 92   BUN 8 9 7*   CREATININE 0.67 0.58 " 0.72   CALCIUM 9.7 8.7 8.4*     Recent Labs     07/25/23  1815 07/26/23  0025 07/27/23  0025   ASTSGOT 283* 256* 107*   ALTSGPT 296* 307* 244*   TBILIRUBIN 2.8* 2.4* 2.5*   ALKPHOSPHAT 171* 148* 160*   GLOBULIN 2.4 1.9 1.9            IMAGING:   [id number]   Final Result      1.  Nondistended gallbladder with debris and/or sludge present.  Surrounding edema or fluid concerning for acute cholecystitis.   2.  No biliary dilation or common bile duct stone.   3.  Stomach is distended with fluid.      US-RUQ   Final Result      1. Gallbladder sludge. No gallbladder wall thickening or pericholecystic fluid.   2. Mildly dilated main portal vein.         DX-CHEST-PORTABLE (1 VIEW)   Final Result         1. No acute cardiopulmonary abnormalities are identified.          ASSESSMENT AND PLAN:     * Cholelithiasis without cholangitis- (present on admission)  Assessment & Plan  7/26 MRCP with nondistended gallbladder with debris and/or sludge present.  Surrounding edema or fluid concerning for acute cholecystitis. No biliary dilation or common bile duct stone.  7/27 Plan for laparoscopic cholecystectomy.  [facility] Blue Service.    No contraindication to deep vein thrombosis (DVT) prophylaxis- (present on admission)  Assessment & Plan  7/26 Enoxaparin initiated.    Plan lap chiqui today.  May have diet post op.  Follow up placed in Epic.  Disposition per medicine.       ____________________________________     EMMA Rolon.    DD: 7/27/2023  8:16 AM

## 2023-07-27 NOTE — OR NURSING
PACU-Patient resting comfortably, sitting up and tolerating fluids. Abdominal stab sites x4 CDI and soft to palpation.

## 2023-07-27 NOTE — CARE PLAN
The patient is Stable - Low risk of patient condition declining or worsening    Shift Goals  Clinical Goals: Pain control, lap chiqui  Patient Goals: Pain control, rest, surgery, get home to cat  Family Goals: LORENA      Problem: Pain - Standard  Goal: Alleviation of pain or a reduction in pain to the patient’s comfort goal  Outcome: Progressing     Problem: Knowledge Deficit - Standard  Goal: Patient and family/care givers will demonstrate understanding of plan of care, disease process/condition, diagnostic tests and medications  Outcome: Progressing

## 2023-07-27 NOTE — OP REPORT
OP Note    PreOp Diagnosis:   1.  Choledocholithiasis    PostOp Diagnosis:   1.  Choledocholithiasis  2.  Acute cholecystitis    Procedure(s):  1.  Laparoscopic cholecystectomy    Wound Class: Clean Contaminated    Surgeon(s):  Miguel Sullivan M.D.    Assistant:  YOSELIN Otto    Anesthesiologist/Type of Anesthesia:  Anesthesiologist: Maria Eugenia Quispe M.D./General    Surgical Staff:  Assistant: PRICE Rolon  Circulator: Rowan Elizondo R.N.  Scrub Person: Cher Winters  Count [place]: Tonja Hernandez R.N.    Specimens removed if any:  ID Type Source Tests Collected by Time Destination   A : gallbladder Other Other PATHOLOGY SPECIMEN Miguel Sullivan M.D. 7/27/2023 10:55 AM        Estimated Blood Loss: 50 cc    Findings:  1.  Significant pericholecystic edema    Complications: None observed    OPERATIVE REPORT:   The patient was met in the preoperative holding area where all of the potential risks and benefits of the procedure were discussed in detail with the patient. All of his questions were answered to his satisfaction and informed consent was signed. The patient was taken back to the operating room and placed supine on the operating room table. General endotracheal anesthesia was induced and all of the patients pressure points were padded appropriately. The patients abdomen was prepped and draped in the usual sterile fashion.  A timeout was performed which correctly identified the patient and the procedure being performed and preoperative antibiotics were given according to SCIP guidelines.     The procedure was started with infiltration of 0.5% marcaine into the infra-umbilical skin. A 12mm incision was made and carried down to fascia using a hemostat. The verress needle was inserted into the abdominal cavity and insufflation carried out to 15mmHg.  A 5mm visiport trochar was placed through this incision and the camera inserted into the abdominal cavity. A brief survey of the  abdominal cavity demonstrated no obvious injuries or pathologies.  Two 5mm ports were then placed in the right upper quadrant, and a 12mm port placed in the epigastric area, all under direct visualization after infiltration of 0.5% marcaine.    The gallbladder was located in its right upper quadrant position.  It was retracted superiorly and laterally.  The contents of Calot's triangle were dissected clearly free.  Once a critical view of safety was obtained, the cystic duct was twice clipped on the common duct side and clipped once on the gallbladder side.  This was then transected.  The cystic artery was dealt with in a similar fashion.  Hook electrocautery was used to transect the attachments of the gallbladder to the liver bed.  An Endo Catch bag was used to retrieve the gallbladder through the subxiphoid incision.    The right upper quadrant was copiously irrigated till clear.  The clips were in place without biliary spillage.  Hemostasis was achieved.  An Endo Close was used to close the fascia of the subxiphoid incision.  4-0 Monocryl was used to reapproximate the skin edges and Dermabond was applied as a dressing.    The patient tolerated the procedure well and was extubated at the conclusion of the case without incident. All of the sponge, needle and instrument counts were correct at the conclusion of the case. After he was awoken from anesthesia he was taken to the recovery room in stable condition.         7/27/2023 11:12 AM Miguel Sullivan M.D.

## 2023-07-27 NOTE — ANESTHESIA PROCEDURE NOTES
Airway    Date/Time: 7/27/2023 10:20 AM    Performed by: Maria Eugenia Quispe M.D.  Authorized by: Maria Eugenia Quispe M.D.    Location:  OR  Urgency:  Elective  Indications for Airway Management:  Anesthesia      Spontaneous Ventilation: absent    Sedation Level:  Deep  Preoxygenated: Yes    Patient Position:  Sniffing  Mask Difficulty Assessment:  0 - not attempted  Final Airway Type:  Endotracheal airway  Final Endotracheal Airway:  ETT  Cuffed: Yes    Technique Used for Successful ETT Placement:  Direct laryngoscopy  Devices/Methods Used in Placement:  Cricoid pressure and intubating stylet    Insertion Site:  Oral  Blade Type:  Felipe  Laryngoscope Blade/Videolaryngoscope Blade Size:  3  ETT Size (mm):  7.0  Measured from:  Teeth  ETT to Teeth (cm):  21  Placement Verified by: capnometry and palpation of cuff    Cormack-Lehane Classification:  Grade IIa - partial view of glottis  Number of Attempts at Approach:  1

## 2023-07-27 NOTE — ANESTHESIA TIME REPORT
Anesthesia Start and Stop Event Times     Date Time Event    7/27/2023 0917 Ready for Procedure     1013 Anesthesia Start     1119 Anesthesia Stop        Responsible Staff  07/27/23    Name Role Begin End    Maria Eugenia Quispe M.D. Anesth 1013 1119        Overtime Reason:  no overtime (within assigned shift)    Comments:

## 2023-07-27 NOTE — PROGRESS NOTES
Telephone report given to CAROLINA Nino in [facility] Pre-op.

## 2023-07-27 NOTE — OR SURGEON
OP Note    PreOp Diagnosis:   1.  Choledocholithiasis    PostOp Diagnosis:   1.  Choledocholithiasis  2.  Acute cholecystitis    Procedure(s):  1.  Laparoscopic cholecystectomy    Wound Class: Clean Contaminated    Surgeon(s):  Miguel Sullivan M.D.    Assistant:  YOSELIN Otto    Anesthesiologist/Type of Anesthesia:  Anesthesiologist: Maria Eugenia Quispe M.D./General    Surgical Staff:  Assistant: PRICE Rolon  Circulator: Rowan Elizondo R.N.  Scrub Person: Cher Winters  Count [place]: Tonja Hernandez R.N.    Specimens removed if any:  ID Type Source Tests Collected by Time Destination   A : gallbladder Other Other PATHOLOGY SPECIMEN Miguel Sullivan M.D. 7/27/2023 10:55 AM        Estimated Blood Loss: 50 cc    Findings:  1.  Significant pericholecystic edema    Complications: None observed    OPERATIVE REPORT:   The patient was met in the preoperative holding area where all of the potential risks and benefits of the procedure were discussed in detail with the patient. All of his questions were answered to his satisfaction and informed consent was signed. The patient was taken back to the operating room and placed supine on the operating room table. General endotracheal anesthesia was induced and all of the patients pressure points were padded appropriately. The patients abdomen was prepped and draped in the usual sterile fashion.  A timeout was performed which correctly identified the patient and the procedure being performed and preoperative antibiotics were given according to SCIP guidelines.     The procedure was started with infiltration of 0.5% marcaine into the infra-umbilical skin. A 12mm incision was made and carried down to fascia using a hemostat. The verress needle was inserted into the abdominal cavity and insufflation carried out to 15mmHg.  A 5mm visiport trochar was placed through this incision and the camera inserted into the abdominal cavity. A brief survey of the  abdominal cavity demonstrated no obvious injuries or pathologies.  Two 5mm ports were then placed in the right upper quadrant, and a 12mm port placed in the epigastric area, all under direct visualization after infiltration of 0.5% marcaine.    The gallbladder was located in its right upper quadrant position.  It was retracted superiorly and laterally.  The contents of Calot's triangle were dissected clearly free.  Once a critical view of safety was obtained, the cystic duct was twice clipped on the common duct side and clipped once on the gallbladder side.  This was then transected.  The cystic artery was dealt with in a similar fashion.  Hook electrocautery was used to transect the attachments of the gallbladder to the liver bed.  An Endo Catch bag was used to retrieve the gallbladder through the subxiphoid incision.    The right upper quadrant was copiously irrigated till clear.  The clips were in place without biliary spillage.  Hemostasis was achieved.  An Endo Close was used to close the fascia of the subxiphoid incision.  4-0 Monocryl was used to reapproximate the skin edges and Dermabond was applied as a dressing.    The patient tolerated the procedure well and was extubated at the conclusion of the case without incident. All of the sponge, needle and instrument counts were correct at the conclusion of the case. After he was awoken from anesthesia he was taken to the recovery room in stable condition.         7/27/2023 11:12 AM Miguel Sullivan M.D.

## 2023-07-27 NOTE — ANESTHESIA PREPROCEDURE EVALUATION
Case: [id number] Date/Time: 07/27/23 1000    Procedure: CHOLECYSTECTOMY, LAPAROSCOPIC    Location: [facility] OR 09 / SURGERY [facility]    Surgeons: Miguel Sullivan M.D.        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    Pertinent diagnostic labs and testing reviewed    Informed Consent:    Anesthetic plan and risks discussed with patient.    Use of blood products discussed with: patient whom consented to blood products.

## 2023-07-28 NOTE — DISCHARGE SUMMARY
Discharge Summary    CHIEF COMPLAINT ON ADMISSION  Chief Complaint   Patient presents with    Rib Pain     Patient c/o right rib and chest wall pain x 1 day.        Reason for Admission  rt rib pain     Admission Date  7/25/2023    CODE STATUS  Prior    HPI & HOSPITAL COURSE  Luis Manuel Hernandez is a 42 y.o. male who presented 7/25/2023 with past medical history of tobacco dependence, brain aneurysm with stroke, and prior alcohol abuse who presents with complaints of right lower chest pain /right upper quadrant abdominal pain of 1 day duration.  Sharp pain, lasting for several hours, 8 out of 10. No specific alleviating/aggravating factors.     In ED he was given morphine and zofran for pain and nausea. No leukocytosis, , , , Tbili 2.8. RUQ ultrasound with GB sludge but no duct dilatation or wall thickening. EDP consulted GI and general surgery to further evaluate cholecystitis. He was admitted to hospitalist service for observation and further management.     On 7/26, no fever/leukocytosis. Pain adequately controlled. No evidence of biliary obstruction on MRCP - discussed with GI Dr. Calloway, no need to proceed with ERCP. General surgery scheduled him for laparoscopic cholecystectomy in the morning.     On 7/27 he had lap chiqui with Dr. Sullivan. He tolerated procedure well and was subsequently advanced to a bland diet. He did have moderate incisional pain post-operatively that was treated with Norco 5mg. By afternoon he was ambulating the unit several times independently and tolerating a full diet.     Provided prescription for short course Norco 5mg. No post-operative antibiotics required by surgery team.  He was scheduled for follow up with Dr. Sullivan in 1 week for wound check, with CMP to be done the day before appointment. Referral also sent to establish with a primary care doctor.     Therefore, he is discharged in good and stable condition to home with close outpatient follow-up.    The patient  recovered much more quickly than anticipated on admission.    Discharge Date  7/27/2023    FOLLOW UP ITEMS POST DISCHARGE  Follow with [facility] in 1 week  Have CMP lab drawn 1 day before appointment  Establish with a PCP    DISCHARGE DIAGNOSES  Principal Problem (Resolved):    Cholelithiasis without cholangitis (POA: Yes)  Active Problems:    Tobacco dependence (POA: Yes)    Anxiety (POA: Yes)  Resolved Problems:    No contraindication to deep vein thrombosis (DVT) prophylaxis (POA: Yes)      FOLLOW UP  No future appointments.  [facility]  [address]  [place] [address]  [phone]    Schedule an appointment as soon as possible for a visit in 1 week(s)  For wound re-check in the ACS clinic. Complete ordered blood work one day prior to return appointment.    PCP    Follow up  Follow up with a primary care doctor within 2 weeks      MEDICATIONS ON DISCHARGE     Medication List        START taking these medications        Instructions   HYDROcodone-acetaminophen 5-325 MG Tabs per tablet  Commonly known as: Norco   Take 1 Tablet by mouth every 6 hours as needed (for severe pain) for up to 3 days.  Dose: 1 Tablet              Allergies  No Known Allergies    DIET  No orders of the defined types were placed in this encounter.      ACTIVITY  As tolerated.  Weight bearing as tolerated    CONSULTATIONS  GI, general surgery    PROCEDURES  7/27: Laparoscopic cholecystectomy    LABORATORY  Lab Results   Component Value Date    SODIUM 141 07/27/2023    POTASSIUM 4.7 07/27/2023    CHLORIDE 107 07/27/2023    CO2 27 07/27/2023    GLUCOSE 92 07/27/2023    BUN 7 (L) 07/27/2023    CREATININE 0.72 07/27/2023        Lab Results   Component Value Date    WBC 4.8 07/27/2023    HEMOGLOBIN 13.8 (L) 07/27/2023    HEMATOCRIT 41.4 (L) 07/27/2023    PLATELETCT 244 07/27/2023        Total time of the discharge process exceeds 36 minutes.

## 2024-05-24 NOTE — ED NOTES
Echo tech at bedside at this time     Refill requested for:       sildenafil (REVATIO) 20 MG tablet   finasteride (PROSCAR) 5 MG tablet     Last refill:     sildenafil (REVATIO) 20 MG tablet  1/24/2024 #30 X 3 refill   finasteride (PROSCAR) 5 MG tablet  2/22/2024 #90 x 3 refill     Last office visit: 2/22/2024      Scheduled office visit: None    Medication refilled per protocol.

## 2025-07-10 ENCOUNTER — PHARMACY VISIT (OUTPATIENT)
Dept: PHARMACY | Facility: MEDICAL CENTER | Age: 45
End: 2025-07-10
Payer: COMMERCIAL

## 2025-07-10 ENCOUNTER — HOSPITAL ENCOUNTER (EMERGENCY)
Facility: MEDICAL CENTER | Age: 45
End: 2025-07-10
Attending: STUDENT IN AN ORGANIZED HEALTH CARE EDUCATION/TRAINING PROGRAM
Payer: MEDICAID

## 2025-07-10 VITALS
OXYGEN SATURATION: 99 % | HEART RATE: 76 BPM | BODY MASS INDEX: 20.93 KG/M2 | RESPIRATION RATE: 16 BRPM | HEIGHT: 71 IN | TEMPERATURE: 98.3 F | DIASTOLIC BLOOD PRESSURE: 80 MMHG | SYSTOLIC BLOOD PRESSURE: 130 MMHG | WEIGHT: 149.47 LBS

## 2025-07-10 DIAGNOSIS — B35.3 TINEA PEDIS OF BOTH FEET: ICD-10-CM

## 2025-07-10 DIAGNOSIS — L03.119 CELLULITIS OF FOOT: Primary | ICD-10-CM

## 2025-07-10 PROCEDURE — A9270 NON-COVERED ITEM OR SERVICE: HCPCS | Mod: UD | Performed by: STUDENT IN AN ORGANIZED HEALTH CARE EDUCATION/TRAINING PROGRAM

## 2025-07-10 PROCEDURE — 700102 HCHG RX REV CODE 250 W/ 637 OVERRIDE(OP): Mod: UD | Performed by: STUDENT IN AN ORGANIZED HEALTH CARE EDUCATION/TRAINING PROGRAM

## 2025-07-10 PROCEDURE — RXMED WILLOW AMBULATORY MEDICATION CHARGE: Performed by: STUDENT IN AN ORGANIZED HEALTH CARE EDUCATION/TRAINING PROGRAM

## 2025-07-10 PROCEDURE — 99284 EMERGENCY DEPT VISIT MOD MDM: CPT

## 2025-07-10 RX ORDER — NAPROXEN 375 MG/1
375 TABLET ORAL 2 TIMES DAILY WITH MEALS
Qty: 14 TABLET | Refills: 0 | Status: SHIPPED | OUTPATIENT
Start: 2025-07-10 | End: 2025-07-17

## 2025-07-10 RX ORDER — NAPROXEN 375 MG/1
375 TABLET ORAL 2 TIMES DAILY WITH MEALS
Qty: 14 TABLET | Refills: 0 | Status: SHIPPED | OUTPATIENT
Start: 2025-07-10 | End: 2025-07-10

## 2025-07-10 RX ORDER — CEPHALEXIN 500 MG/1
500 CAPSULE ORAL 4 TIMES DAILY
Qty: 28 CAPSULE | Refills: 0 | Status: ACTIVE | OUTPATIENT
Start: 2025-07-10

## 2025-07-10 RX ORDER — CEPHALEXIN 500 MG/1
500 CAPSULE ORAL 4 TIMES DAILY
Qty: 28 CAPSULE | Refills: 0 | Status: ACTIVE | OUTPATIENT
Start: 2025-07-10 | End: 2025-07-10

## 2025-07-10 RX ORDER — NAPROXEN 500 MG/1
500 TABLET ORAL ONCE
Status: COMPLETED | OUTPATIENT
Start: 2025-07-10 | End: 2025-07-10

## 2025-07-10 RX ORDER — CLOTRIMAZOLE 1 %
1 CREAM (GRAM) TOPICAL 2 TIMES DAILY
Qty: 30 G | Refills: 0 | Status: SHIPPED | OUTPATIENT
Start: 2025-07-10 | End: 2025-07-10

## 2025-07-10 RX ORDER — CLOTRIMAZOLE 1 %
CREAM (GRAM) TOPICAL ONCE
Status: COMPLETED | OUTPATIENT
Start: 2025-07-10 | End: 2025-07-10

## 2025-07-10 RX ORDER — CLOTRIMAZOLE 1 %
1 CREAM (GRAM) TOPICAL 2 TIMES DAILY
Qty: 30 G | Refills: 0 | Status: SHIPPED | OUTPATIENT
Start: 2025-07-10

## 2025-07-10 RX ADMIN — CLOTRIMAZOLE: 10 CREAM TOPICAL at 19:36

## 2025-07-10 RX ADMIN — NAPROXEN 500 MG: 500 TABLET ORAL at 19:36

## 2025-07-10 ASSESSMENT — FIBROSIS 4 INDEX: FIB4 SCORE: 1.29

## 2025-07-10 NOTE — ED TRIAGE NOTES
"Chief Complaint   Patient presents with    Leg Swelling     Pt has bilateral feet swelling/ redness. Reports severe pain. Onset x 1 week.      Pt limping to triage due to foot pain. Skin signs pink, warm, dry. Pt speaking full sentences, A & O x 4. Respirations equal and unlabored. Pt educated on triage process and to alert staff of any changing conditions. Pt returned to the lobby.     BP (!) 162/97   Pulse 93   Temp 36.9 °C (98.5 °F) (Temporal)   Resp 18   Ht 1.803 m (5' 11\")   Wt 67.8 kg (149 lb 7.6 oz)   SpO2 96%   BMI 20.85 kg/m²       "

## 2025-07-11 NOTE — ED PROVIDER NOTES
ED Provider Note    CHIEF COMPLAINT  Chief Complaint   Patient presents with    Leg Swelling     Pt has bilateral feet swelling/ redness. Reports severe pain. Onset x 1 week.        EXTERNAL RECORDS REVIEWED  Inpatient Notes discharge summary on 7/27/2023 for patient with past medical history of brain aneurysm, stroke, tobacco dependence and alcohol abuse who presented with right lower chest pain right upper quadrant abdominal pain for a day.  Patient had elevated LFTs.  Ultrasound demonstrated gallbladder sludge.  Patient underwent cholecystectomy, patient tolerated procedure well    HPI/ROS  LIMITATION TO HISTORY   Select: : None  OUTSIDE HISTORIAN(S):    Luis Manuel Hernandez is a 44 y.o. male who presents with bilateral feet swelling and redness.  Patient reports the pain is severe.  Patient reports that it started a week ago.  Patient denies fevers chills body aches or sweats.  Patient reports it is painful to walk on.  Patient denies any trauma.  Patient denies any precipitating event.    PAST MEDICAL HISTORY   has a past medical history of Brain aneurysm, Memory loss, Seizure (HCC), and Stroke (HCC).    SURGICAL HISTORY   has a past surgical history that includes craniotomy aneurysm (Left); facial fracture orif; and chiqui by laparoscopy (N/A, 7/27/2023).    FAMILY HISTORY  Family History   Problem Relation Age of Onset    Heart Disease Maternal Grandmother     Heart Disease Maternal Grandfather     Cancer Maternal Grandfather 68        pancreatic       SOCIAL HISTORY  Social History     Tobacco Use    Smoking status: Every Day     Current packs/day: 0.50     Average packs/day: 0.5 packs/day for 20.0 years (10.0 ttl pk-yrs)     Types: Cigarettes    Smokeless tobacco: Never   Vaping Use    Vaping status: Not on file   Substance and Sexual Activity    Alcohol use: Not Currently     Alcohol/week: 0.6 oz     Types: 1 Glasses of wine per week     Comment: x1 year    Drug use: Not Currently     Types: Marijuana      "Comment: speed, meth, and cocaine last used 15 years ago, never IV    Sexual activity: Not Currently     Partners: Female     Birth control/protection: Condom       CURRENT MEDICATIONS  Home Medications       Reviewed by Veronica Bell R.N. (Registered Nurse) on 07/10/25 at 165  Med List Status: Partial     Medication Last Dose Status        Patient Jaden Taking any Medications                           ALLERGIES  Allergies[1]    PHYSICAL EXAM  VITAL SIGNS: BP (!) 147/82   Pulse 83   Temp 36.9 °C (98.5 °F) (Temporal)   Resp 18   Ht 1.803 m (5' 11\")   Wt 67.8 kg (149 lb 7.6 oz)   SpO2 99%   BMI 20.85 kg/m²    Vitals and nursing note reviewed.   Constitutional:       Comments: Patient is lying in bed supine, pleasant, conversant, speaking in complete sentences   HENT:      Head: Normocephalic and atraumatic.   Eyes:      Extraocular Movements: Extraocular movements intact.      Conjunctiva/sclera: Conjunctivae normal.      Pupils: Pupils are equal, round, and reactive to light.   Cardiovascular:      Pulses: Normal pulses.      Comments: HR 83  Pulmonary:      Effort: Pulmonary effort is normal. No respiratory distress.   Musculoskeletal:         General: No swelling. Normal range of motion.      Cervical back: Normal range of motion. No rigidity.   Sensation movement intact and DP pulses intact in the bilateral feet.  Skin:  Patient's feet bilaterally are very dry and cracked, superficial erythema that is warm and tender and blanching without crepitus     General: Skin is warm and dry.      Capillary Refill: Capillary refill takes less than 2 seconds.   Neurological:      Mental Status: Alert.         COURSE & MEDICAL DECISION MAKING    ASSESSMENT, COURSE AND PLAN  Care Narrative: Patient with signs symptoms consistent with tinea pedis but I am somewhat concerned that the patient may also have cellulitis.  Patient covered with antibiotics orally and antifungals topically.  No sign of neurovascular compromise. "  No sign of necrotizing soft tissue infection.  No sign of sepsis.  Patient counseled on moisturizing the skin.  Patient counseled on washing feet regularly with soap and water    Repeat physical exam benign.  I doubt any serious emergency process at this time.  Patient and/or family, friends given strict return precautions for worsening symptoms and care instructions. They have demonstrated understanding of discharge instructions through teach back mechanism. Advised PCP follow-up in 1-2 days.  Patient/family/friend expresses understanding and agrees to plan.    This dictation has been created using voice recognition software. I am continuously working with the software to minimize the number of voice recognition errors and I have made every attempt to manually correct the errors within my dictation. However errors  related to this voice recognition software may still exist and should be interpreted within the appropriate context.     Electronically signed by: Miguel Hdez M.D., 7/10/2025 7:33 PM      DISPOSITION AND DISCUSSIONS    Escalation of care considered, and ultimately not performed:diagnostic imaging    Barriers to care at this time, including but not limited to: Patient does not have established PCP.     Decision tools and prescription drugs considered including, but not limited to: Pain Medications  over-the-counter pain medications are appropriate, narcotics not indicated at this time  .    FINAL DIAGNOSIS  1. Cellulitis of foot    2. Tinea pedis of both feet         Electronically signed by: Miguel Hdez M.D., 7/10/2025 6:38 PM           [1] No Known Allergies

## 2025-07-11 NOTE — ED NOTES
Pt discharged to home. Pt was given follow up instructions and prescriptions for Naproxen, Keflex, and Lotrimin. Pt verbalized understanding of all instructions for discharge and is ambulatory out of ED with steady gait. AOx4

## (undated) DEVICE — SUTURE 4-0 MONOCRYL PLUS PS-2 - 27 INCH (36/BX)

## (undated) DEVICE — CANNULA W/SEAL12X100ZTHREAD - (12/BX)

## (undated) DEVICE — GOWN SURGEONS X-LARGE - DISP. (30/CA)

## (undated) DEVICE — SLEEVE VASO CALF MED - (10PR/CA)

## (undated) DEVICE — CANISTER SUCTION 3000ML MECHANICAL FILTER AUTO SHUTOFF MEDI-VAC NONSTERILE LF DISP  (40EA/CA)

## (undated) DEVICE — GLOVE BIOGEL PI INDICATOR SZ 6.5 SURGICAL PF LF - (50/BX 4BX/CA)

## (undated) DEVICE — SET EXTENSION WITH 2 PORTS (48EA/CA) ***PART #2C8610 IS A SUBSTITUTE*****

## (undated) DEVICE — SUTURE 0 VICRYL PLUS UR-6 - 27 INCH (36/BX)

## (undated) DEVICE — SET SUCTION/IRRIGATION WITH DISPOSABLE TIP (6/CA )PART #0250-070-520 IS A SUB

## (undated) DEVICE — SUTURE GENERAL

## (undated) DEVICE — SUTURE 0 COATED VICRYL 6-18IN - (12PK/BX)

## (undated) DEVICE — DERMABOND ADVANCED - (12EA/BX)

## (undated) DEVICE — SET TUBING PNEUMOCLEAR HIGH FLOW SMOKE EVACUATION (10EA/BX)

## (undated) DEVICE — SCISSORS 5MM CVD (6EA/BX)

## (undated) DEVICE — SENSOR OXIMETER ADULT SPO2 RD SET (20EA/BX)

## (undated) DEVICE — LACTATED RINGERS INJ 1000 ML - (14EA/CA 60CA/PF)

## (undated) DEVICE — CANNULA W/SEAL 5X100 Z-THRE - ADED KII (12/BX)

## (undated) DEVICE — GOWN WARMING STANDARD FLEX - (30/CA)

## (undated) DEVICE — CHLORAPREP 26 ML APPLICATOR - ORANGE TINT(25/CA)

## (undated) DEVICE — NEEDLE INSFL 120MM 14GA VRRS - (20/BX)

## (undated) DEVICE — SUCTION INSTRUMENT YANKAUER BULBOUS TIP W/O VENT (50EA/CA)

## (undated) DEVICE — PACK LAP CHOLE OR - (2EA/CA)

## (undated) DEVICE — TUBING CLEARLINK DUO-VENT - C-FLO (48EA/CA)

## (undated) DEVICE — CLIP MED LG INTNL HRZN TI ESCP - (20/BX)

## (undated) DEVICE — GLOVE SZ 7 BIOGEL PI MICRO - PF LF (50PR/BX 4BX/CA)

## (undated) DEVICE — SODIUM CHL IRRIGATION 0.9% 1000ML (12EA/CA)

## (undated) DEVICE — GLOVE SZ 6.5 BIOGEL PI MICRO - PF LF (50PR/BX)

## (undated) DEVICE — TROCAR 5X100 NON BLADED Z-TH - READ KII (6/BX)

## (undated) DEVICE — GLOVE BIOGEL PI INDICATOR SZ 7.0 SURGICAL PF LF - (50/BX 4BX/CA)

## (undated) DEVICE — BAG RETRIEVAL 10ML (10EA/BX)

## (undated) DEVICE — DRAPESURG STERI-DRAPE LONG - (10/BX 4BX/CA)

## (undated) DEVICE — ELECTRODE DUAL RETURN W/ CORD - (50/PK)

## (undated) DEVICE — COVER LIGHT HANDLE ALC PLUS DISP (18EA/BX)

## (undated) DEVICE — TROCAR 12X100 BLADED ADVANC FIXATION (6EA/BX)

## (undated) DEVICE — SET LEADWIRE 5 LEAD BEDSIDE DISPOSABLE ECG (1SET OF 5/EA)